# Patient Record
Sex: MALE | Race: WHITE | URBAN - METROPOLITAN AREA
[De-identification: names, ages, dates, MRNs, and addresses within clinical notes are randomized per-mention and may not be internally consistent; named-entity substitution may affect disease eponyms.]

---

## 2023-12-05 ENCOUNTER — OFFICE VISIT (OUTPATIENT)
Dept: ORTHOPEDIC SURGERY | Age: 68
End: 2023-12-05
Payer: MEDICARE

## 2023-12-05 VITALS — BODY MASS INDEX: 22.49 KG/M2 | HEIGHT: 65 IN | WEIGHT: 135 LBS

## 2023-12-05 DIAGNOSIS — M19.012 OSTEOARTHRITIS OF LEFT GLENOHUMERAL JOINT: ICD-10-CM

## 2023-12-05 DIAGNOSIS — M25.512 LEFT SHOULDER PAIN, UNSPECIFIED CHRONICITY: Primary | ICD-10-CM

## 2023-12-05 DIAGNOSIS — M18.12 ARTHRITIS OF CARPOMETACARPAL (CMC) JOINT OF LEFT THUMB: ICD-10-CM

## 2023-12-05 PROCEDURE — 20610 DRAIN/INJ JOINT/BURSA W/O US: CPT | Performed by: ORTHOPAEDIC SURGERY

## 2023-12-05 PROCEDURE — 1123F ACP DISCUSS/DSCN MKR DOCD: CPT | Performed by: ORTHOPAEDIC SURGERY

## 2023-12-05 PROCEDURE — 1036F TOBACCO NON-USER: CPT | Performed by: ORTHOPAEDIC SURGERY

## 2023-12-05 PROCEDURE — 3017F COLORECTAL CA SCREEN DOC REV: CPT | Performed by: ORTHOPAEDIC SURGERY

## 2023-12-05 PROCEDURE — 99204 OFFICE O/P NEW MOD 45 MIN: CPT | Performed by: ORTHOPAEDIC SURGERY

## 2023-12-05 PROCEDURE — G8427 DOCREV CUR MEDS BY ELIG CLIN: HCPCS | Performed by: ORTHOPAEDIC SURGERY

## 2023-12-05 PROCEDURE — G8420 CALC BMI NORM PARAMETERS: HCPCS | Performed by: ORTHOPAEDIC SURGERY

## 2023-12-05 PROCEDURE — G8484 FLU IMMUNIZE NO ADMIN: HCPCS | Performed by: ORTHOPAEDIC SURGERY

## 2023-12-05 RX ORDER — TRAZODONE HYDROCHLORIDE 100 MG/1
100 TABLET ORAL
COMMUNITY
Start: 2023-11-05

## 2023-12-05 RX ORDER — FLUOXETINE HYDROCHLORIDE 20 MG/1
20 CAPSULE ORAL
COMMUNITY
Start: 2023-10-16

## 2023-12-05 RX ORDER — LOSARTAN POTASSIUM 25 MG/1
25 TABLET ORAL DAILY
COMMUNITY
Start: 2023-11-14

## 2023-12-05 RX ORDER — BUPROPION HYDROCHLORIDE 150 MG/1
150 TABLET ORAL
COMMUNITY
Start: 2023-10-04

## 2023-12-05 RX ORDER — RIVAROXABAN 20 MG/1
20 TABLET, FILM COATED ORAL
COMMUNITY
Start: 2023-11-17

## 2023-12-05 RX ORDER — SPIRONOLACTONE 25 MG/1
25 TABLET ORAL
COMMUNITY
Start: 2023-09-24

## 2023-12-05 RX ORDER — HYDROCODONE BITARTRATE AND ACETAMINOPHEN 10; 325 MG/1; MG/1
TABLET ORAL
COMMUNITY
Start: 2023-12-01

## 2023-12-05 RX ORDER — DILTIAZEM HYDROCHLORIDE 120 MG/1
120 CAPSULE, EXTENDED RELEASE ORAL DAILY
COMMUNITY
Start: 2023-11-05

## 2023-12-15 ENCOUNTER — OFFICE VISIT (OUTPATIENT)
Dept: ORTHOPEDIC SURGERY | Age: 68
End: 2023-12-15

## 2023-12-15 DIAGNOSIS — M79.642 BILATERAL HAND PAIN: ICD-10-CM

## 2023-12-15 DIAGNOSIS — M79.641 BILATERAL HAND PAIN: ICD-10-CM

## 2023-12-15 DIAGNOSIS — M79.642 LEFT HAND PAIN: Primary | ICD-10-CM

## 2023-12-15 DIAGNOSIS — M18.12 ARTHRITIS OF CARPOMETACARPAL (CMC) JOINT OF LEFT THUMB: ICD-10-CM

## 2023-12-15 RX ORDER — BETAMETHASONE SODIUM PHOSPHATE AND BETAMETHASONE ACETATE 3; 3 MG/ML; MG/ML
6 INJECTION, SUSPENSION INTRA-ARTICULAR; INTRALESIONAL; INTRAMUSCULAR; SOFT TISSUE ONCE
Status: COMPLETED | OUTPATIENT
Start: 2023-12-15 | End: 2023-12-15

## 2023-12-15 RX ORDER — MELOXICAM 15 MG/1
15 TABLET ORAL DAILY
Qty: 30 TABLET | Refills: 1 | Status: CANCELLED | OUTPATIENT
Start: 2023-12-15 | End: 2024-02-13

## 2023-12-15 RX ADMIN — BETAMETHASONE SODIUM PHOSPHATE AND BETAMETHASONE ACETATE 6 MG: 3; 3 INJECTION, SUSPENSION INTRA-ARTICULAR; INTRALESIONAL; INTRAMUSCULAR; SOFT TISSUE at 08:36

## 2023-12-15 NOTE — PROGRESS NOTES
Orthopaedic Hand Surgery Note    Name: Nurys Gonzales  Age: 70 y.o. YOB: 1955  Gender: male  MRN: [id number]    CC: New patient referred for hand pain    HPI: Patient is a 70 y.o. male  right hand dominant with a chief complaint of left thumb pain and weakness. The patient complains of increased pain with activities involving pinch and  (ex/ opening jars, turning car key, buttons). Evaluation to date has included none. Treatment to date has included none. The current pain is rated a 7/10, alleviated by rest and interferes with daily activities. ROS/Meds/PSH/PMH/FH/SH: I personally reviewed the patients standard intake form. Pertinents are discussed in the HPI    Physical Examination:  General: Awake and alert. HEENT: Normocephalic, atraumatic  CV/Pulm: Breathing even and unlabored  Skin: No obvious rashes noted. Lymphatic: No obvious evidence of lymphedema or lymphadenopathy    Musculoskeletal:   Examination of the left upper extremity demonstrates normal sensation in median, ulnar and radial distribution, cap refill in all fingers < 5 seconds, negative carpal tunnel compression and Phalen test.  Mild prominence and instability of the base of first metacarpal. CMC grind is positive for pain and crepitus. Thumb MCP joint hyperextends 5 degrees. No pain at the radial styloid. Examination of right hand demonstrates mild swelling of the right ring finger PIP joint with tenderness to palpation    Imaging / Electrodiagnostic Tests:     bilateral Hand XR: AP, Lateral, Oblique and Thumb CMC joint     Clinical Indication:  1. Left hand pain    2. Bilateral hand pain    3.  Arthritis of carpometacarpal (CMC) joint of left thumb           Report: AP, lateral, oblique and thumb CMC joint x-ray demonstrates joint space narrowing, subluxation and osteophytic changes of the trapezium consistent with osteoarthritis of the thumb CMC joint, stage IV on the left, stage II in the right    Impression:

## 2024-01-16 ENCOUNTER — OFFICE VISIT (OUTPATIENT)
Dept: ORTHOPEDIC SURGERY | Age: 69
End: 2024-01-16
Payer: MEDICARE

## 2024-01-16 DIAGNOSIS — M25.512 LEFT SHOULDER PAIN, UNSPECIFIED CHRONICITY: Primary | ICD-10-CM

## 2024-01-16 DIAGNOSIS — M19.012 OSTEOARTHRITIS OF LEFT GLENOHUMERAL JOINT: ICD-10-CM

## 2024-01-16 PROCEDURE — G8427 DOCREV CUR MEDS BY ELIG CLIN: HCPCS | Performed by: ORTHOPAEDIC SURGERY

## 2024-01-16 PROCEDURE — G8484 FLU IMMUNIZE NO ADMIN: HCPCS | Performed by: ORTHOPAEDIC SURGERY

## 2024-01-16 PROCEDURE — 99214 OFFICE O/P EST MOD 30 MIN: CPT | Performed by: ORTHOPAEDIC SURGERY

## 2024-01-16 PROCEDURE — 1123F ACP DISCUSS/DSCN MKR DOCD: CPT | Performed by: ORTHOPAEDIC SURGERY

## 2024-01-16 PROCEDURE — 3017F COLORECTAL CA SCREEN DOC REV: CPT | Performed by: ORTHOPAEDIC SURGERY

## 2024-01-16 PROCEDURE — 1036F TOBACCO NON-USER: CPT | Performed by: ORTHOPAEDIC SURGERY

## 2024-01-16 PROCEDURE — G8420 CALC BMI NORM PARAMETERS: HCPCS | Performed by: ORTHOPAEDIC SURGERY

## 2024-01-16 NOTE — PROGRESS NOTES
joint       Rule out internal derangement left shoulder  Status post EUA and manipulation left shoulder arthroscopy left shoulder ASD, ADCR, debridement SLAP tear, lysis of adhesions 10.5 years  Status post arthroscopy right shoulder ASD ADCR ASR 16 years  Cervical spondylosis at multiple levels  Status post previous right knee arthroscopy  This post previous left knee arthroscopy  Status post revision open fasciotomy debridement extensor mechanism repair lateral epicondyle of both elbows  Status post primary open fasciotomy debridement and extensor mechanism repair lateral epicondyle of both elbows  Hypertension  Atrial fibrillation on Xarelto  Chronic pain on Norco 10    Plan:   I discussed the problem with the patient.  I discussed nonoperative versus operative intervention including injections.  The left shoulder injection helped but he still having pain in the left shoulder.  I discussed the potential need for a reverse left total shoulder arthroplasty if he does not improve.  Will defer surgery for now.  We will defer further injections for now.  I would like to obtain an MRI of the left shoulder.  If Dr. Calderón feels that his left thumb does require surgery he can proceed first.  I will recheck him back following the MRI of the left shoulder  4 This is an undiagnosed new problem with uncertain prognosis    Follow up: No follow-ups on file.             SAMUEL ALEXIS JR, MD

## 2024-01-29 ENCOUNTER — TELEPHONE (OUTPATIENT)
Dept: ORTHOPEDIC SURGERY | Age: 69
End: 2024-01-29

## 2024-01-29 NOTE — TELEPHONE ENCOUNTER
Patient states he received an email saying his mri was rescheduled from tomorrow however his my chart is still showing tomorrow. States if it has to be changed see if he can go some place else in order to keep return appt.

## 2024-01-29 NOTE — TELEPHONE ENCOUNTER
Called and spoke with the patient, everything is straightened out for his MRI tomorrow. Patient has a follow up appointment.

## 2024-01-30 ENCOUNTER — HOSPITAL ENCOUNTER (OUTPATIENT)
Dept: MRI IMAGING | Age: 69
Discharge: HOME | End: 2024-02-02
Attending: ORTHOPAEDIC SURGERY
Payer: MEDICARE

## 2024-01-30 DIAGNOSIS — M25.512 LEFT SHOULDER PAIN, UNSPECIFIED CHRONICITY: ICD-10-CM

## 2024-01-30 PROCEDURE — 73221 MRI JOINT UPR EXTREM W/O DYE: CPT

## 2024-02-06 ENCOUNTER — OFFICE VISIT (OUTPATIENT)
Dept: ORTHOPEDIC SURGERY | Age: 69
End: 2024-02-06

## 2024-02-06 DIAGNOSIS — M75.22 BICIPITAL TENDINITIS OF LEFT SHOULDER: ICD-10-CM

## 2024-02-06 DIAGNOSIS — M19.012 OSTEOARTHRITIS OF LEFT GLENOHUMERAL JOINT: Primary | ICD-10-CM

## 2024-02-14 ENCOUNTER — TELEPHONE (OUTPATIENT)
Dept: ORTHOPEDIC SURGERY | Age: 69
End: 2024-02-14

## 2024-02-27 DIAGNOSIS — M19.012 OSTEOARTHRITIS OF LEFT GLENOHUMERAL JOINT: ICD-10-CM

## 2024-02-27 DIAGNOSIS — M75.22 BICIPITAL TENDINITIS OF LEFT SHOULDER: ICD-10-CM

## 2024-03-07 ENCOUNTER — HOSPITAL ENCOUNTER (OUTPATIENT)
Dept: SURGERY | Age: 69
Discharge: HOME | End: 2024-03-07
Payer: MEDICARE

## 2024-03-07 VITALS
WEIGHT: 142.86 LBS | HEIGHT: 65 IN | HEART RATE: 76 BPM | DIASTOLIC BLOOD PRESSURE: 89 MMHG | RESPIRATION RATE: 16 BRPM | OXYGEN SATURATION: 97 % | BODY MASS INDEX: 23.8 KG/M2 | SYSTOLIC BLOOD PRESSURE: 147 MMHG | TEMPERATURE: 98.6 F

## 2024-03-07 LAB
ANION GAP SERPL CALC-SCNC: 5 MMOL/L (ref 2–11)
APPEARANCE UR: CLEAR
APTT PPP: 38.1 SEC (ref 23.3–37.4)
BACTERIA URNS QL MICRO: 0 /HPF
BILIRUB UR QL: NEGATIVE
BUN SERPL-MCNC: 15 MG/DL (ref 8–23)
CALCIUM SERPL-MCNC: 8.9 MG/DL (ref 8.3–10.4)
CHLORIDE SERPL-SCNC: 111 MMOL/L (ref 103–113)
CO2 SERPL-SCNC: 25 MMOL/L (ref 21–32)
COLOR UR: YELLOW
CREAT SERPL-MCNC: 1.26 MG/DL (ref 0.8–1.5)
ERYTHROCYTE [DISTWIDTH] IN BLOOD BY AUTOMATED COUNT: 11.6 % (ref 11.9–14.6)
GLUCOSE SERPL-MCNC: 103 MG/DL (ref 65–100)
GLUCOSE UR STRIP.AUTO-MCNC: NEGATIVE MG/DL
HCT VFR BLD AUTO: 43.9 % (ref 41.1–50.3)
HGB BLD-MCNC: 15.2 G/DL (ref 13.6–17.2)
HGB UR QL STRIP: ABNORMAL
INR PPP: 1.7
KETONES UR QL STRIP.AUTO: NEGATIVE MG/DL
LEUKOCYTE ESTERASE UR QL STRIP.AUTO: NEGATIVE
MAGNESIUM SERPL-MCNC: 2.2 MG/DL (ref 1.8–2.4)
MCH RBC QN AUTO: 31.1 PG (ref 26.1–32.9)
MCHC RBC AUTO-ENTMCNC: 34.6 G/DL (ref 31.4–35)
MCV RBC AUTO: 90 FL (ref 82–102)
MRSA DNA SPEC QL NAA+PROBE: NOT DETECTED
NITRITE UR QL STRIP.AUTO: NEGATIVE
NRBC # BLD: 0 K/UL (ref 0–0.2)
PH UR STRIP: 6 (ref 5–9)
PLATELET # BLD AUTO: 184 K/UL (ref 150–450)
PMV BLD AUTO: 9.8 FL (ref 9.4–12.3)
POTASSIUM SERPL-SCNC: 4.3 MMOL/L (ref 3.5–5.1)
PROT UR STRIP-MCNC: NEGATIVE MG/DL
PROTHROMBIN TIME: 19.8 SEC (ref 11.3–14.9)
RBC # BLD AUTO: 4.88 M/UL (ref 4.23–5.6)
S AUREUS CPE NOSE QL NAA+PROBE: NOT DETECTED
SODIUM SERPL-SCNC: 141 MMOL/L (ref 136–146)
SP GR UR REFRACTOMETRY: 1.01 (ref 1–1.02)
UROBILINOGEN UR QL STRIP.AUTO: 0.2 EU/DL (ref 0.2–1)
WBC # BLD AUTO: 4.7 K/UL (ref 4.3–11.1)

## 2024-03-07 PROCEDURE — 85730 THROMBOPLASTIN TIME PARTIAL: CPT

## 2024-03-07 PROCEDURE — 81003 URINALYSIS AUTO W/O SCOPE: CPT

## 2024-03-07 PROCEDURE — 85610 PROTHROMBIN TIME: CPT

## 2024-03-07 PROCEDURE — 87641 MR-STAPH DNA AMP PROBE: CPT

## 2024-03-07 PROCEDURE — 80048 BASIC METABOLIC PNL TOTAL CA: CPT

## 2024-03-07 PROCEDURE — 83735 ASSAY OF MAGNESIUM: CPT

## 2024-03-07 PROCEDURE — 85027 COMPLETE CBC AUTOMATED: CPT

## 2024-03-07 RX ORDER — DILTIAZEM HYDROCHLORIDE 120 MG/1
120 CAPSULE, EXTENDED RELEASE ORAL NIGHTLY
COMMUNITY

## 2024-03-07 ASSESSMENT — PAIN SCALES - GENERAL: PAINLEVEL_OUTOF10: 5

## 2024-03-07 NOTE — PERIOP NOTE
PLEASE CONTINUE TAKING ALL PRESCRIPTION MEDICATIONS UP TO THE DAY OF SURGERY UNLESS OTHERWISE DIRECTED BELOW.     DISCONTINUE all over the counter vitamins, supplements, and herbals starting now. DISCONTINUE Non-Steroidal Anti-Inflammatory (NSAIDS) such as Advil, Ibuprofen, Motrin, Naproxen, and Aleve 5 days prior to surgery.      *IT IS OK TO TAKE TYLENOL, Allergy medication, and indigestion medications*     Prescription medications to HOLD     Follow surgeon/cardiologist instructions regarding Xarelto           CONTINUE all other prescriptions like normal up until the day of surgery--TAKE ONLY THE BELOW MEDICATIONS THE DAY OF SURGERY.    Norco if needed, Wellbutrin, Prozac              Comments       Bring Dynahex soap and incentive spirometer back to the hospital.          Please do not bring home medications with you on the day of surgery unless otherwise directed by your nurse.  If you are instructed to bring home medications, please give them to your nurse as they will be administered by the nursing staff.     If you have any questions, please call [facility] [phone].     A copy of this note was provided to the patient for reference.

## 2024-03-07 NOTE — PERIOP NOTE
Patient verified name and .    Order for consent NOT found in EHR; patient verified.     Type 3 surgery, joint assessment complete.    Labs per surgeon: No orders received.   Labs per anesthesia protocol: cbc, bmp, mg, pt/ptt, ua; results pending.   EKG:Completed 24; results to be reviewed by anesthesia. Charge nurse to f/u.     Cardiology office note (24) with pre-op evaluation \"Patient denies symptoms consistent with unstable angina, decompensated heart failure, persistent uncontrolled atrial fibrillation, or ventricular arrhythmia. Able to achieve >4 METS of activity without easy fatigability, activity-limiting dyspnea, or chest pain. Appears euvolemic on exam today. ECG notes old inferior infarct pattern, chronic however and is noted on prior ECG from . RCRI class II risk.\"    Echo (23) in EHR if needed.     MRSA/MSSA swab collected; pharmacy to review and dose antibiotic as appropriate.     Hospital approved surgical skin cleanser and instructions to return bottle on DOS given per hospital policy.    Patient provided with handouts including Guide to Surgery, Pain Management, Hand Hygiene, Blood Transfusion Education, and [facility] Brochure.    Patient answered medical/surgical history questions at their best of ability. All prior to admission medications documented in [facility]. Original medication prescription bottle not visualized during patient appointment.     Patient instructed to hold all vitamins, supplements, herbals 3 weeks prior to surgery and NSAIDS 5 days prior to surgery.     Pt instructed to follow surgeon/cardiologist instructions regarding Xarelto.     Patient teach back successful and patient demonstrates knowledge of instruction.

## 2024-03-07 NOTE — PERIOP NOTE
PT/PTT results to be reviewed by anesthesia--charge nurse to f/u. All other lab results listed below are within anesthesia guidelines.      Latest Reference Range & Units 03/07/24 07:37   Sodium 136 - 146 mmol/L 141   Potassium 3.5 - 5.1 mmol/L 4.3   Chloride 103 - 113 mmol/L 111   CO2 21 - 32 mmol/L 25   BUN,BUNPL 8 - 23 MG/DL 15   Creatinine 0.8 - 1.5 MG/DL 1.26   Anion Gap 2 - 11 mmol/L 5   Est, Glom Filt Rate >60 ml/min/1.73m2 >60   Magnesium 1.8 - 2.4 mg/dL 2.2   Glucose, Random 65 - 100 mg/dL 103 (H)   CALCIUM, SERUM, 929855 8.3 - 10.4 MG/DL 8.9   WBC 4.3 - 11.1 K/uL 4.7   RBC 4.23 - 5.6 M/uL 4.88   Hemoglobin Quant 13.6 - 17.2 g/dL 15.2   Hematocrit 41.1 - 50.3 % 43.9   MCV 82.0 - 102.0 FL 90.0   MCH 26.1 - 32.9 PG 31.1   MCHC 31.4 - 35.0 g/dL 34.6   MPV 9.4 - 12.3 FL 9.8   RDW 11.9 - 14.6 % 11.6 (L)   Platelet Count 150 - 450 K/uL 184   Nucleated Red Blood Cells 0.0 - 0.2 K/uL 0.00   Prothrombin Time 11.3 - 14.9 sec 19.8 (H)   INR -   1.7   APTT 23.3 - 37.4 SEC 38.1 (H)   Color, UA -   YELLOW   Glucose, UA NEG mg/dL Negative   Bilirubin, Urine NEG   Negative   Ketones, Urine NEG mg/dL Negative   Specific Gravity, UA 1.001 - 1.023   1.010   Blood, Urine NEG   TRACE !   Protein, UA NEG mg/dL Negative   Urobilinogen, Urine 0.2 - 1.0 EU/dL 0.2   Nitrite, Urine NEG   Negative   Leukocyte Esterase, Urine NEG   Negative   Appearance -   CLEAR   pH, Urine 5.0 - 9.0   6.0   Bacteria, UA 0 /hpf 0   MRSA/STAPH AUREUS DNA  Rpt   (H): Data is abnormally high  (L): Data is abnormally low  !: Data is abnormal  Rpt: View report in Results Review for more information

## 2024-03-08 ENCOUNTER — PREP FOR PROCEDURE (OUTPATIENT)
Dept: ORTHOPEDIC SURGERY | Age: 69
End: 2024-03-08

## 2024-03-08 DIAGNOSIS — M19.012 OSTEOARTHRITIS OF LEFT GLENOHUMERAL JOINT: Primary | ICD-10-CM

## 2024-03-08 RX ORDER — SODIUM CHLORIDE 0.9 % (FLUSH) 0.9 %
5-40 SYRINGE (ML) INJECTION EVERY 12 HOURS SCHEDULED
Status: CANCELLED | OUTPATIENT
Start: 2024-03-08

## 2024-03-08 RX ORDER — SODIUM CHLORIDE 9 MG/ML
INJECTION, SOLUTION INTRAVENOUS PRN
Status: CANCELLED | OUTPATIENT
Start: 2024-03-08

## 2024-03-08 RX ORDER — SODIUM CHLORIDE 0.9 % (FLUSH) 0.9 %
5-40 SYRINGE (ML) INJECTION PRN
Status: CANCELLED | OUTPATIENT
Start: 2024-03-08

## 2024-03-10 NOTE — H&P
Subjective:     Patient is a 68 y.o. RHD MALE WITH LEFT SHOULDER PAIN.    SEE OFFICE NOTE.    Patient Active Problem List    Diagnosis Date Noted    Osteoarthritis of left glenohumeral joint 02/27/2024    Bicipital tendinitis of left shoulder 02/27/2024    Chondromalacia of patella 07/16/2013    Tear of lateral cartilage or meniscus of knee, current 07/16/2013    Chondromalacia 07/16/2013    Superior glenoid labrum lesion 04/18/2013    Adhesive capsulitis of shoulder 04/18/2013     Past Medical History:   Diagnosis Date    Anxiety and depression     managed with medication    Atrial fibrillation (HCC)     on Xarelto, followed by Cardiology    Essential hypertension     managed with medication    Heart failure with improved ejection fraction (HFimpEF) ([facility])     Followed by Cardiology    History of skin cancer     melanoma and squamous cell    Insomnia     Mixed hyperlipidemia       Past Surgical History:   Procedure Laterality Date    KNEE ARTHROSCOPY Bilateral     ORTHOPEDIC SURGERY Bilateral     elbow    SHOULDER ARTHROSCOPY Bilateral       Prior to Admission medications    Medication Sig Start Date End Date Taking? Authorizing Provider   dilTIAZem (CARDIZEM 12 HR) 120 MG extended release capsule Take 1 capsule by mouth nightly    Ray Roy MD   buPROPion (WELLBUTRIN XL) 150 MG extended release tablet Take 1 tablet by mouth 10/4/23   Ray Roy MD   FLUoxetine (PROZAC) 20 MG capsule Take 1 capsule by mouth 10/16/23   Ray Roy MD   HYDROcodone-acetaminophen (NORCO)  MG per tablet Take 1 tablet by mouth every 6 hours as needed for Pain. 12/1/23   Ray Roy MD   losartan (COZAAR) 25 MG tablet Take 1 tablet by mouth at bedtime 11/14/23   Ray Roy MD   XARELTO 20 MG TABS tablet Take 1 tablet by mouth Daily with supper 11/17/23   Ray Roy MD   spironolactone (ALDACTONE) 25 MG tablet Take 1 tablet by mouth daily 1/2 tablet in the morning

## 2024-03-11 NOTE — PROGRESS NOTES
mouth every 6 hours as needed for Pain. 12/1/23   Ray Roy MD   losartan (COZAAR) 25 MG tablet Take 1 tablet by mouth at bedtime 11/14/23   Ray Roy MD   XARELTO 20 MG TABS tablet Take 1 tablet by mouth Daily with supper 11/17/23   Ray Roy MD   spironolactone (ALDACTONE) 25 MG tablet Take 1 tablet by mouth daily 1/2 tablet in the morning 9/24/23   Ray Roy MD   traZODone (DESYREL) 100 MG tablet Take 1 tablet by mouth nightly 11/5/23   Ray Roy MD     Allergies   Allergen Reactions    Carvedilol Other (See Comments)     Skin peeling when Coreg dose was increased    Meloxicam Shortness Of Breath    Pregabalin Hallucinations and Shortness Of Breath     Feel depressed    Aspirin Other (See Comments)     GI Upset    Cimetidine      Other reaction(s): kidneys    Metoclopramide      Other reaction(s): insomina          Objective:     Physical Exam:   No data found.    ECG:    No results found for this or any previous visit (from the past 4464 hour(s)).    Data Review:   Labs:   Labs reviewed    Problem List:  )  Patient Active Problem List   Diagnosis    Chondromalacia of patella    Tear of lateral cartilage or meniscus of knee, current    Superior glenoid labrum lesion    Chondromalacia    Adhesive capsulitis of shoulder    Osteoarthritis of left glenohumeral joint    Bicipital tendinitis of left shoulder       Total Joint Surgery Pre-Assessment Recommendations:           Continuous saturation monitoring UPON ARRIVAL TO FLOOR.  Heated high flow lung expansion x 24 hours and prn.  IP RT consult for respiratory evaluation every shift      Signed By: Janessa Michelle, APRN - CNP-C    March 11, 2024

## 2024-03-13 ENCOUNTER — ANESTHESIA EVENT (OUTPATIENT)
Dept: SURGERY | Age: 69
End: 2024-03-13
Payer: MEDICARE

## 2024-03-14 ENCOUNTER — APPOINTMENT (OUTPATIENT)
Dept: GENERAL RADIOLOGY | Age: 69
End: 2024-03-14
Attending: ORTHOPAEDIC SURGERY
Payer: MEDICARE

## 2024-03-14 ENCOUNTER — ANESTHESIA (OUTPATIENT)
Dept: SURGERY | Age: 69
End: 2024-03-14
Payer: MEDICARE

## 2024-03-14 ENCOUNTER — HOSPITAL ENCOUNTER (OUTPATIENT)
Age: 69
Setting detail: OBSERVATION
Discharge: HOME | End: 2024-03-16
Attending: ORTHOPAEDIC SURGERY | Admitting: ORTHOPAEDIC SURGERY
Payer: MEDICARE

## 2024-03-14 DIAGNOSIS — M19.012 OSTEOARTHRITIS OF LEFT GLENOHUMERAL JOINT: ICD-10-CM

## 2024-03-14 DIAGNOSIS — M19.012 OSTEOARTHRITIS OF GLENOHUMERAL JOINT, LEFT: Primary | ICD-10-CM

## 2024-03-14 LAB
ABO + RH BLD: NORMAL
BLOOD GROUP ANTIBODIES SERPL: NORMAL
EST. AVERAGE GLUCOSE BLD GHB EST-MCNC: 97 MG/DL
GLUCOSE BLD STRIP.AUTO-MCNC: 105 MG/DL (ref 65–100)
HBA1C MFR BLD: 5 % (ref 4.8–5.6)
SERVICE CMNT-IMP: ABNORMAL
SPECIMEN EXP DATE BLD: NORMAL

## 2024-03-14 PROCEDURE — 2580000003 HC RX 258: Performed by: ANESTHESIOLOGY

## 2024-03-14 PROCEDURE — 86900 BLOOD TYPING SEROLOGIC ABO: CPT

## 2024-03-14 PROCEDURE — C1776 JOINT DEVICE (IMPLANTABLE): HCPCS | Performed by: ORTHOPAEDIC SURGERY

## 2024-03-14 PROCEDURE — 7100000000 HC PACU RECOVERY - FIRST 15 MIN: Performed by: ORTHOPAEDIC SURGERY

## 2024-03-14 PROCEDURE — 2580000003 HC RX 258: Performed by: NURSE ANESTHETIST, CERTIFIED REGISTERED

## 2024-03-14 PROCEDURE — 3600000015 HC SURGERY LEVEL 5 ADDTL 15MIN: Performed by: ORTHOPAEDIC SURGERY

## 2024-03-14 PROCEDURE — 94761 N-INVAS EAR/PLS OXIMETRY MLT: CPT

## 2024-03-14 PROCEDURE — 2700000000 HC OXYGEN THERAPY PER DAY

## 2024-03-14 PROCEDURE — 3600000005 HC SURGERY LEVEL 5 BASE: Performed by: ORTHOPAEDIC SURGERY

## 2024-03-14 PROCEDURE — 83036 HEMOGLOBIN GLYCOSYLATED A1C: CPT

## 2024-03-14 PROCEDURE — 82962 GLUCOSE BLOOD TEST: CPT

## 2024-03-14 PROCEDURE — C1713 ANCHOR/SCREW BN/BN,TIS/BN: HCPCS | Performed by: ORTHOPAEDIC SURGERY

## 2024-03-14 PROCEDURE — 2720000010 HC SURG SUPPLY STERILE: Performed by: ORTHOPAEDIC SURGERY

## 2024-03-14 PROCEDURE — 6360000002 HC RX W HCPCS: Performed by: ANESTHESIOLOGY

## 2024-03-14 PROCEDURE — 2500000003 HC RX 250 WO HCPCS: Performed by: NURSE ANESTHETIST, CERTIFIED REGISTERED

## 2024-03-14 PROCEDURE — G0378 HOSPITAL OBSERVATION PER HR: HCPCS

## 2024-03-14 PROCEDURE — 6370000000 HC RX 637 (ALT 250 FOR IP): Performed by: ANESTHESIOLOGY

## 2024-03-14 PROCEDURE — 6360000002 HC RX W HCPCS: Performed by: NURSE ANESTHETIST, CERTIFIED REGISTERED

## 2024-03-14 PROCEDURE — 86850 RBC ANTIBODY SCREEN: CPT

## 2024-03-14 PROCEDURE — 2709999900 HC NON-CHARGEABLE SUPPLY: Performed by: ORTHOPAEDIC SURGERY

## 2024-03-14 PROCEDURE — 3700000000 HC ANESTHESIA ATTENDED CARE: Performed by: ORTHOPAEDIC SURGERY

## 2024-03-14 PROCEDURE — 6370000000 HC RX 637 (ALT 250 FOR IP): Performed by: ORTHOPAEDIC SURGERY

## 2024-03-14 PROCEDURE — 7100000001 HC PACU RECOVERY - ADDTL 15 MIN: Performed by: ORTHOPAEDIC SURGERY

## 2024-03-14 PROCEDURE — 86901 BLOOD TYPING SEROLOGIC RH(D): CPT

## 2024-03-14 PROCEDURE — 23472 RECONSTRUCT SHOULDER JOINT: CPT | Performed by: ORTHOPAEDIC SURGERY

## 2024-03-14 PROCEDURE — 2580000003 HC RX 258: Performed by: ORTHOPAEDIC SURGERY

## 2024-03-14 PROCEDURE — 6360000002 HC RX W HCPCS: Performed by: ORTHOPAEDIC SURGERY

## 2024-03-14 PROCEDURE — 3700000001 HC ADD 15 MINUTES (ANESTHESIA): Performed by: ORTHOPAEDIC SURGERY

## 2024-03-14 PROCEDURE — 73030 X-RAY EXAM OF SHOULDER: CPT

## 2024-03-14 PROCEDURE — 94760 N-INVAS EAR/PLS OXIMETRY 1: CPT

## 2024-03-14 DEVICE — SCREW BNE L24MM DIA4.5MM PROX CORT TIB S STL ST LOK FULL: Type: IMPLANTABLE DEVICE | Site: SHOULDER | Status: FUNCTIONAL

## 2024-03-14 DEVICE — SCREW BNE L50MM DIA4.5MM PROX CORT TIB S STL ST LOK FULL: Type: IMPLANTABLE DEVICE | Site: SHOULDER | Status: FUNCTIONAL

## 2024-03-14 DEVICE — DELTA XTEND MONOBLOC HUM CEMENTED EPIPHYSIS SZ2 /DIA12 STD
Type: IMPLANTABLE DEVICE | Site: SHOULDER | Status: FUNCTIONAL
Brand: DELTA XTEND

## 2024-03-14 DEVICE — CEMENT BNE 20ML 41GM FULL DOSE PMMA W/ TOBRA M VISC RADPQ: Type: IMPLANTABLE DEVICE | Site: SHOULDER | Status: FUNCTIONAL

## 2024-03-14 DEVICE — SPACER HUM +9MM OFFSET SHLDR POLYETH FOR DELT XTEND REV SYS: Type: IMPLANTABLE DEVICE | Site: SHOULDER | Status: FUNCTIONAL

## 2024-03-14 DEVICE — PLUG, CEMENT SZ. 12.0
Type: IMPLANTABLE DEVICE | Site: SHOULDER | Status: FUNCTIONAL
Brand: DJO SURGICAL

## 2024-03-14 DEVICE — COMPONENT GLEN FIX DIA10MM SHLDR METAGLENE LNG PEG GLOB: Type: IMPLANTABLE DEVICE | Site: SHOULDER | Status: FUNCTIONAL

## 2024-03-14 DEVICE — DELTA XTEND LATERALIZED GLENOSPHERE +4MM ECCENTRIC 038MM
Type: IMPLANTABLE DEVICE | Site: SHOULDER | Status: FUNCTIONAL
Brand: DELTA XTEND

## 2024-03-14 DEVICE — CUP HUM DIA38MM +9MM OFFSET STD SHLDR POLYETH DELT XTEND: Type: IMPLANTABLE DEVICE | Site: SHOULDER | Status: FUNCTIONAL

## 2024-03-14 DEVICE — SHOULDER S3 TOT ADV REVERSE IMPL CAPPED S3: Type: IMPLANTABLE DEVICE | Status: FUNCTIONAL

## 2024-03-14 RX ORDER — HYDROMORPHONE HYDROCHLORIDE 4 MG/1
4 TABLET ORAL
Status: DISCONTINUED | OUTPATIENT
Start: 2024-03-14 | End: 2024-03-15

## 2024-03-14 RX ORDER — SODIUM CHLORIDE 0.9 % (FLUSH) 0.9 %
5-40 SYRINGE (ML) INJECTION PRN
Status: DISCONTINUED | OUTPATIENT
Start: 2024-03-14 | End: 2024-03-14 | Stop reason: HOSPADM

## 2024-03-14 RX ORDER — PROMETHAZINE HYDROCHLORIDE 25 MG/1
25 TABLET ORAL EVERY 4 HOURS PRN
Status: DISCONTINUED | OUTPATIENT
Start: 2024-03-14 | End: 2024-03-16 | Stop reason: HOSPADM

## 2024-03-14 RX ORDER — SODIUM CHLORIDE 0.9 % (FLUSH) 0.9 %
5-40 SYRINGE (ML) INJECTION PRN
Status: DISCONTINUED | OUTPATIENT
Start: 2024-03-14 | End: 2024-03-16 | Stop reason: HOSPADM

## 2024-03-14 RX ORDER — HYDROMORPHONE HYDROCHLORIDE 1 MG/ML
1 INJECTION, SOLUTION INTRAMUSCULAR; INTRAVENOUS; SUBCUTANEOUS
Status: DISCONTINUED | OUTPATIENT
Start: 2024-03-14 | End: 2024-03-15 | Stop reason: SDUPTHER

## 2024-03-14 RX ORDER — OXYCODONE HYDROCHLORIDE 5 MG/1
5 TABLET ORAL PRN
Status: DISCONTINUED | OUTPATIENT
Start: 2024-03-14 | End: 2024-03-14 | Stop reason: HOSPADM

## 2024-03-14 RX ORDER — GLYCOPYRROLATE 0.2 MG/ML
INJECTION INTRAMUSCULAR; INTRAVENOUS PRN
Status: DISCONTINUED | OUTPATIENT
Start: 2024-03-14 | End: 2024-03-14 | Stop reason: SDUPTHER

## 2024-03-14 RX ORDER — EPHEDRINE SULFATE 5 MG/ML
INJECTION INTRAVENOUS PRN
Status: DISCONTINUED | OUTPATIENT
Start: 2024-03-14 | End: 2024-03-14 | Stop reason: SDUPTHER

## 2024-03-14 RX ORDER — BISACODYL 10 MG
10 SUPPOSITORY, RECTAL RECTAL DAILY PRN
Status: DISCONTINUED | OUTPATIENT
Start: 2024-03-14 | End: 2024-03-16 | Stop reason: HOSPADM

## 2024-03-14 RX ORDER — SODIUM CHLORIDE 0.9 % (FLUSH) 0.9 %
5-40 SYRINGE (ML) INJECTION EVERY 12 HOURS SCHEDULED
Status: DISCONTINUED | OUTPATIENT
Start: 2024-03-14 | End: 2024-03-14 | Stop reason: HOSPADM

## 2024-03-14 RX ORDER — LIDOCAINE HYDROCHLORIDE 10 MG/ML
1 INJECTION, SOLUTION INFILTRATION; PERINEURAL
Status: DISCONTINUED | OUTPATIENT
Start: 2024-03-14 | End: 2024-03-14 | Stop reason: HOSPADM

## 2024-03-14 RX ORDER — ACETAMINOPHEN 500 MG
1000 TABLET ORAL ONCE
Status: COMPLETED | OUTPATIENT
Start: 2024-03-14 | End: 2024-03-14

## 2024-03-14 RX ORDER — HYDROMORPHONE HYDROCHLORIDE 1 MG/ML
0.5 INJECTION, SOLUTION INTRAMUSCULAR; INTRAVENOUS; SUBCUTANEOUS EVERY 5 MIN PRN
Status: DISCONTINUED | OUTPATIENT
Start: 2024-03-14 | End: 2024-03-14 | Stop reason: HOSPADM

## 2024-03-14 RX ORDER — SODIUM CHLORIDE 0.9 % (FLUSH) 0.9 %
5-40 SYRINGE (ML) INJECTION EVERY 12 HOURS SCHEDULED
Status: DISCONTINUED | OUTPATIENT
Start: 2024-03-14 | End: 2024-03-16 | Stop reason: HOSPADM

## 2024-03-14 RX ORDER — OXYCODONE HYDROCHLORIDE 5 MG/1
10 TABLET ORAL PRN
Status: DISCONTINUED | OUTPATIENT
Start: 2024-03-14 | End: 2024-03-14 | Stop reason: HOSPADM

## 2024-03-14 RX ORDER — HYDROMORPHONE HYDROCHLORIDE 2 MG/1
2 TABLET ORAL
Status: DISCONTINUED | OUTPATIENT
Start: 2024-03-14 | End: 2024-03-15

## 2024-03-14 RX ORDER — TRAZODONE HYDROCHLORIDE 50 MG/1
100 TABLET ORAL NIGHTLY
Status: DISCONTINUED | OUTPATIENT
Start: 2024-03-14 | End: 2024-03-16 | Stop reason: HOSPADM

## 2024-03-14 RX ORDER — ONDANSETRON 2 MG/ML
4 INJECTION INTRAMUSCULAR; INTRAVENOUS
Status: DISCONTINUED | OUTPATIENT
Start: 2024-03-14 | End: 2024-03-14 | Stop reason: HOSPADM

## 2024-03-14 RX ORDER — PROCHLORPERAZINE EDISYLATE 5 MG/ML
5 INJECTION INTRAMUSCULAR; INTRAVENOUS
Status: DISCONTINUED | OUTPATIENT
Start: 2024-03-14 | End: 2024-03-14 | Stop reason: HOSPADM

## 2024-03-14 RX ORDER — SODIUM CHLORIDE 9 MG/ML
INJECTION, SOLUTION INTRAVENOUS PRN
Status: DISCONTINUED | OUTPATIENT
Start: 2024-03-14 | End: 2024-03-16 | Stop reason: HOSPADM

## 2024-03-14 RX ORDER — ALBUTEROL SULFATE 2.5 MG/3ML
2.5 SOLUTION RESPIRATORY (INHALATION) EVERY 6 HOURS PRN
Status: DISCONTINUED | OUTPATIENT
Start: 2024-03-14 | End: 2024-03-16 | Stop reason: HOSPADM

## 2024-03-14 RX ORDER — ROCURONIUM BROMIDE 10 MG/ML
INJECTION, SOLUTION INTRAVENOUS PRN
Status: DISCONTINUED | OUTPATIENT
Start: 2024-03-14 | End: 2024-03-14 | Stop reason: SDUPTHER

## 2024-03-14 RX ORDER — ONDANSETRON 4 MG/1
4 TABLET, ORALLY DISINTEGRATING ORAL EVERY 8 HOURS PRN
Status: DISCONTINUED | OUTPATIENT
Start: 2024-03-14 | End: 2024-03-16 | Stop reason: HOSPADM

## 2024-03-14 RX ORDER — ONDANSETRON 2 MG/ML
INJECTION INTRAMUSCULAR; INTRAVENOUS PRN
Status: DISCONTINUED | OUTPATIENT
Start: 2024-03-14 | End: 2024-03-14 | Stop reason: SDUPTHER

## 2024-03-14 RX ORDER — NEOSTIGMINE METHYLSULFATE 1 MG/ML
INJECTION, SOLUTION INTRAVENOUS PRN
Status: DISCONTINUED | OUTPATIENT
Start: 2024-03-14 | End: 2024-03-14 | Stop reason: SDUPTHER

## 2024-03-14 RX ORDER — MIDAZOLAM HYDROCHLORIDE 2 MG/2ML
2 INJECTION, SOLUTION INTRAMUSCULAR; INTRAVENOUS
Status: COMPLETED | OUTPATIENT
Start: 2024-03-14 | End: 2024-03-14

## 2024-03-14 RX ORDER — PROPOFOL 10 MG/ML
INJECTION, EMULSION INTRAVENOUS PRN
Status: DISCONTINUED | OUTPATIENT
Start: 2024-03-14 | End: 2024-03-14 | Stop reason: SDUPTHER

## 2024-03-14 RX ORDER — SODIUM CHLORIDE, SODIUM LACTATE, POTASSIUM CHLORIDE, CALCIUM CHLORIDE 600; 310; 30; 20 MG/100ML; MG/100ML; MG/100ML; MG/100ML
INJECTION, SOLUTION INTRAVENOUS CONTINUOUS
Status: DISCONTINUED | OUTPATIENT
Start: 2024-03-14 | End: 2024-03-14 | Stop reason: HOSPADM

## 2024-03-14 RX ORDER — SODIUM CHLORIDE 9 MG/ML
INJECTION, SOLUTION INTRAVENOUS PRN
Status: DISCONTINUED | OUTPATIENT
Start: 2024-03-14 | End: 2024-03-14

## 2024-03-14 RX ORDER — ENEMA 19; 7 G/133ML; G/133ML
1 ENEMA RECTAL
Status: DISPENSED | OUTPATIENT
Start: 2024-03-14 | End: 2024-03-15

## 2024-03-14 RX ORDER — NALOXONE HYDROCHLORIDE 0.4 MG/ML
INJECTION, SOLUTION INTRAMUSCULAR; INTRAVENOUS; SUBCUTANEOUS PRN
Status: DISCONTINUED | OUTPATIENT
Start: 2024-03-14 | End: 2024-03-14 | Stop reason: HOSPADM

## 2024-03-14 RX ORDER — FLUOXETINE HYDROCHLORIDE 20 MG/1
20 CAPSULE ORAL DAILY
Status: DISCONTINUED | OUTPATIENT
Start: 2024-03-14 | End: 2024-03-16 | Stop reason: HOSPADM

## 2024-03-14 RX ORDER — SPIRONOLACTONE 25 MG/1
25 TABLET ORAL DAILY
Status: DISCONTINUED | OUTPATIENT
Start: 2024-03-14 | End: 2024-03-16 | Stop reason: HOSPADM

## 2024-03-14 RX ORDER — FENTANYL CITRATE 50 UG/ML
100 INJECTION, SOLUTION INTRAMUSCULAR; INTRAVENOUS
Status: COMPLETED | OUTPATIENT
Start: 2024-03-14 | End: 2024-03-14

## 2024-03-14 RX ORDER — DIPHENHYDRAMINE HYDROCHLORIDE 50 MG/ML
12.5 INJECTION INTRAMUSCULAR; INTRAVENOUS
Status: DISCONTINUED | OUTPATIENT
Start: 2024-03-14 | End: 2024-03-14 | Stop reason: HOSPADM

## 2024-03-14 RX ORDER — LOSARTAN POTASSIUM 25 MG/1
25 TABLET ORAL NIGHTLY
Status: DISCONTINUED | OUTPATIENT
Start: 2024-03-14 | End: 2024-03-16 | Stop reason: HOSPADM

## 2024-03-14 RX ORDER — VASOPRESSIN 20 U/ML
INJECTION PARENTERAL PRN
Status: DISCONTINUED | OUTPATIENT
Start: 2024-03-14 | End: 2024-03-14 | Stop reason: SDUPTHER

## 2024-03-14 RX ORDER — SODIUM CHLORIDE 9 MG/ML
INJECTION, SOLUTION INTRAVENOUS PRN
Status: DISCONTINUED | OUTPATIENT
Start: 2024-03-14 | End: 2024-03-14 | Stop reason: HOSPADM

## 2024-03-14 RX ORDER — DOCUSATE SODIUM 100 MG/1
100 CAPSULE, LIQUID FILLED ORAL 2 TIMES DAILY
Status: DISCONTINUED | OUTPATIENT
Start: 2024-03-15 | End: 2024-03-16 | Stop reason: HOSPADM

## 2024-03-14 RX ORDER — LIDOCAINE HYDROCHLORIDE 20 MG/ML
INJECTION, SOLUTION EPIDURAL; INFILTRATION; INTRACAUDAL; PERINEURAL PRN
Status: DISCONTINUED | OUTPATIENT
Start: 2024-03-14 | End: 2024-03-14 | Stop reason: SDUPTHER

## 2024-03-14 RX ORDER — BUPROPION HYDROCHLORIDE 150 MG/1
150 TABLET ORAL DAILY
Status: DISCONTINUED | OUTPATIENT
Start: 2024-03-14 | End: 2024-03-16 | Stop reason: HOSPADM

## 2024-03-14 RX ORDER — HYDROMORPHONE HYDROCHLORIDE 1 MG/ML
0.25 INJECTION, SOLUTION INTRAMUSCULAR; INTRAVENOUS; SUBCUTANEOUS EVERY 5 MIN PRN
Status: DISCONTINUED | OUTPATIENT
Start: 2024-03-14 | End: 2024-03-14 | Stop reason: HOSPADM

## 2024-03-14 RX ORDER — FERROUS SULFATE 325(65) MG
325 TABLET ORAL 2 TIMES DAILY WITH MEALS
Status: DISCONTINUED | OUTPATIENT
Start: 2024-03-15 | End: 2024-03-16 | Stop reason: HOSPADM

## 2024-03-14 RX ADMIN — PHENYLEPHRINE HYDROCHLORIDE 100 MCG: 0.1 INJECTION, SOLUTION INTRAVENOUS at 11:30

## 2024-03-14 RX ADMIN — PROPOFOL 50 MG: 10 INJECTION, EMULSION INTRAVENOUS at 12:04

## 2024-03-14 RX ADMIN — EPHEDRINE SULFATE 5 MG: 5 INJECTION INTRAVENOUS at 11:30

## 2024-03-14 RX ADMIN — PHENYLEPHRINE HYDROCHLORIDE 25 MCG/MIN: 10 INJECTION INTRAVENOUS at 12:45

## 2024-03-14 RX ADMIN — GLYCOPYRROLATE 0.4 MG: 0.2 INJECTION INTRAMUSCULAR; INTRAVENOUS at 12:53

## 2024-03-14 RX ADMIN — MIDAZOLAM 2 MG: 1 INJECTION INTRAMUSCULAR; INTRAVENOUS at 10:03

## 2024-03-14 RX ADMIN — ROCURONIUM BROMIDE 50 MG: 10 INJECTION, SOLUTION INTRAVENOUS at 11:29

## 2024-03-14 RX ADMIN — PHENYLEPHRINE HYDROCHLORIDE 50 MCG: 0.1 INJECTION, SOLUTION INTRAVENOUS at 11:51

## 2024-03-14 RX ADMIN — PHENYLEPHRINE HYDROCHLORIDE 100 MCG: 0.1 INJECTION, SOLUTION INTRAVENOUS at 11:43

## 2024-03-14 RX ADMIN — Medication 3 MG: at 12:53

## 2024-03-14 RX ADMIN — FENTANYL CITRATE 100 MCG: 50 INJECTION INTRAMUSCULAR; INTRAVENOUS at 10:03

## 2024-03-14 RX ADMIN — EPHEDRINE SULFATE 5 MG: 5 INJECTION INTRAVENOUS at 12:56

## 2024-03-14 RX ADMIN — PHENYLEPHRINE HYDROCHLORIDE 100 MCG: 0.1 INJECTION, SOLUTION INTRAVENOUS at 12:12

## 2024-03-14 RX ADMIN — SODIUM CHLORIDE, SODIUM LACTATE, POTASSIUM CHLORIDE, AND CALCIUM CHLORIDE: 600; 310; 30; 20 INJECTION, SOLUTION INTRAVENOUS at 12:19

## 2024-03-14 RX ADMIN — DILTIAZEM HYDROCHLORIDE 120 MG: 30 TABLET, FILM COATED ORAL at 20:15

## 2024-03-14 RX ADMIN — EPHEDRINE SULFATE 5 MG: 5 INJECTION INTRAVENOUS at 11:52

## 2024-03-14 RX ADMIN — HYDROMORPHONE HYDROCHLORIDE 4 MG: 4 TABLET ORAL at 22:10

## 2024-03-14 RX ADMIN — ACETAMINOPHEN 1000 MG: 500 TABLET, FILM COATED ORAL at 08:34

## 2024-03-14 RX ADMIN — ONDANSETRON 4 MG: 2 INJECTION INTRAMUSCULAR; INTRAVENOUS at 11:54

## 2024-03-14 RX ADMIN — TRAZODONE HYDROCHLORIDE 100 MG: 50 TABLET ORAL at 20:15

## 2024-03-14 RX ADMIN — PROPOFOL 200 MG: 10 INJECTION, EMULSION INTRAVENOUS at 11:29

## 2024-03-14 RX ADMIN — VASOPRESSIN 2 UNITS: 20 INJECTION PARENTERAL at 12:17

## 2024-03-14 RX ADMIN — HYDROMORPHONE HYDROCHLORIDE 4 MG: 4 TABLET ORAL at 16:46

## 2024-03-14 RX ADMIN — VASOPRESSIN 2 UNITS: 20 INJECTION PARENTERAL at 12:45

## 2024-03-14 RX ADMIN — SODIUM CHLORIDE, SODIUM LACTATE, POTASSIUM CHLORIDE, AND CALCIUM CHLORIDE: 600; 310; 30; 20 INJECTION, SOLUTION INTRAVENOUS at 11:13

## 2024-03-14 RX ADMIN — HYDROMORPHONE HYDROCHLORIDE 1 MG: 1 INJECTION, SOLUTION INTRAMUSCULAR; INTRAVENOUS; SUBCUTANEOUS at 23:13

## 2024-03-14 RX ADMIN — LOSARTAN POTASSIUM 25 MG: 25 TABLET, FILM COATED ORAL at 20:15

## 2024-03-14 RX ADMIN — CEFAZOLIN 1000 MG: 1 INJECTION, POWDER, FOR SOLUTION INTRAMUSCULAR; INTRAVENOUS at 20:15

## 2024-03-14 RX ADMIN — Medication 2000 MG: at 11:38

## 2024-03-14 RX ADMIN — EPHEDRINE SULFATE 10 MG: 5 INJECTION INTRAVENOUS at 12:12

## 2024-03-14 RX ADMIN — LIDOCAINE HYDROCHLORIDE 100 MG: 20 INJECTION, SOLUTION EPIDURAL; INFILTRATION; INTRACAUDAL; PERINEURAL at 11:29

## 2024-03-14 RX ADMIN — SODIUM CHLORIDE, SODIUM LACTATE, POTASSIUM CHLORIDE, AND CALCIUM CHLORIDE: 600; 310; 30; 20 INJECTION, SOLUTION INTRAVENOUS at 08:27

## 2024-03-14 RX ADMIN — SODIUM CHLORIDE, PRESERVATIVE FREE 10 ML: 5 INJECTION INTRAVENOUS at 20:15

## 2024-03-14 RX ADMIN — VASOPRESSIN 2 UNITS: 20 INJECTION PARENTERAL at 12:34

## 2024-03-14 RX ADMIN — HYDROMORPHONE HYDROCHLORIDE 4 MG: 4 TABLET ORAL at 19:31

## 2024-03-14 ASSESSMENT — PAIN SCALES - GENERAL
PAINLEVEL_OUTOF10: 6
PAINLEVEL_OUTOF10: 6
PAINLEVEL_OUTOF10: 1
PAINLEVEL_OUTOF10: 9
PAINLEVEL_OUTOF10: 6
PAINLEVEL_OUTOF10: 1
PAINLEVEL_OUTOF10: 1
PAINLEVEL_OUTOF10: 2

## 2024-03-14 ASSESSMENT — PAIN DESCRIPTION - ORIENTATION
ORIENTATION: LEFT

## 2024-03-14 ASSESSMENT — PAIN DESCRIPTION - DESCRIPTORS
DESCRIPTORS: ACHING
DESCRIPTORS: TINGLING
DESCRIPTORS: ACHING

## 2024-03-14 ASSESSMENT — PAIN DESCRIPTION - LOCATION
LOCATION: SHOULDER

## 2024-03-14 ASSESSMENT — PAIN - FUNCTIONAL ASSESSMENT
PAIN_FUNCTIONAL_ASSESSMENT: 0-10
PAIN_FUNCTIONAL_ASSESSMENT: PREVENTS OR INTERFERES SOME ACTIVE ACTIVITIES AND ADLS

## 2024-03-14 NOTE — PROGRESS NOTES
03/14/24 1749   Treatment   Treatment Type IS   Oxygen Therapy/Pulse Ox   O2 Therapy Room air   Pulse 85   Respirations 17   SpO2 97 %   Pulse Oximeter Device Mode Intermittent   $Pulse Oximeter $Spot check (single)   Incentive Spirometry Tx   Treatment Effort Assisted by RT

## 2024-03-14 NOTE — CARE COORDINATION
MD order rec'd to arrange home health. Pt initially asking for [facility] but I called their Intake and was told they do not have home PT in the [facility]. Pt then chose [facility]. Referral sent to [facility]. Will follow until d/c.        03/14/24 1320   Service Assessment   Patient Orientation Alert and Oriented   Cognition Alert   History Provided By Patient   Primary Caregiver Self   Services At/After Discharge   Transition of Care Consult (CM Consult) Home Health   Internal Home Health Yes   Services At/After Discharge Home Health   Mode of Transport at Discharge Self   Condition of Participation: Discharge Planning   The Plan for Transition of Care is related to the following treatment goals: improve mobility   The Patient and/or Patient Representative was provided with a Choice of Provider? Patient   The Patient and/Or Patient Representative agree with the Discharge Plan? Yes   Freedom of Choice list was provided with basic dialogue that supports the patient's individualized plan of care/goals, treatment preferences, and shares the quality data associated with the providers?  Yes

## 2024-03-14 NOTE — PERIOP NOTE
TRANSFER - OUT REPORT:    Verbal report given to Tish Chapin RN on Damaso Kaur  being transferred to [facility] for routine post-op       Report consisted of patient's Situation, Background, Assessment and   Recommendations(SBAR).     Information from the following report(s) Nurse Handoff Report and Cardiac Rhythm SR  was reviewed with the receiving nurse.           Lines:   Peripheral IV 03/14/24 Posterior;Right Arm (Active)   Site Assessment Clean, dry & intact 03/14/24 1315   Line Status Infusing 03/14/24 1315   Phlebitis Assessment No symptoms 03/14/24 1315   Infiltration Assessment 0 03/14/24 1315   Dressing Status Clean, dry & intact 03/14/24 1315   Dressing Type Transparent 03/14/24 1315        Opportunity for questions and clarification was provided.      Patient transported with:  Tech

## 2024-03-14 NOTE — PROGRESS NOTES
TRANSFER - IN REPORT:    Verbal report received from CELINA Blanchard on Damaso Kaur  being received from PACU for routine post-op      Report consisted of patient's Situation, Background, Assessment and   Recommendations(SBAR).     Information from the following report(s) Nurse Handoff Report was reviewed with the receiving nurse.    Opportunity for questions and clarification was provided.      Assessment completed upon patient's arrival to unit and care assumed.

## 2024-03-14 NOTE — ANESTHESIA PRE PROCEDURE
Department of Anesthesiology  Preprocedure Note       Name:  Damaso Kaur   Age:  68 y.o.  :  1955                                          MRN:  [id number]         Date:  3/14/2024      Surgeon: Surgeon(s):  Jonah Mark Jr., MD    Procedure: Procedure(s):  left reverse total shoulder arthroplasty with a delta xtend prosthesis and biceps tenodesis in combination with a latissimus dorsi and teres major tendon transfer. general/interscalene. 23hr.    Medications prior to admission:   Prior to Admission medications    Medication Sig Start Date End Date Taking? Authorizing Provider   dilTIAZem (CARDIZEM 12 HR) 120 MG extended release capsule Take 1 capsule by mouth nightly    Ray Roy MD   buPROPion (WELLBUTRIN XL) 150 MG extended release tablet Take 1 tablet by mouth 10/4/23   Ray Roy MD   FLUoxetine (PROZAC) 20 MG capsule Take 1 capsule by mouth 10/16/23   Ray Roy MD   HYDROcodone-acetaminophen (NORCO)  MG per tablet Take 1 tablet by mouth every 6 hours as needed for Pain. 23   Ray Roy MD   losartan (COZAAR) 25 MG tablet Take 1 tablet by mouth at bedtime 23   Ray Roy MD   XARELTO 20 MG TABS tablet Take 1 tablet by mouth Daily with supper 23   Ray Roy MD   spironolactone (ALDACTONE) 25 MG tablet Take 1 tablet by mouth daily 1/2 tablet in the morning 23   Ray Roy MD   traZODone (DESYREL) 100 MG tablet Take 1 tablet by mouth nightly 23   Ray Roy MD       Current medications:    Current Facility-Administered Medications   Medication Dose Route Frequency Provider Last Rate Last Admin    lidocaine 1 % injection 1 mL  1 mL IntraDERmal Once PRN Harish Moe IV, MD        lactated ringers IV soln infusion   IntraVENous Continuous Harish Moe IV,  mL/hr at 24 0827 New Bag at 24 0827    sodium chloride flush 0.9 % injection 5-40 mL  5-40 mL

## 2024-03-14 NOTE — BRIEF OP NOTE
BRIEF OPERATIVE NOTE    Date of Procedure: 3/14/2024     Preoperative Diagnosis:  GLENOHUMERAL OSTEOARTHRITIS LEFT SHOULDER    Postoperative Diagnosis:  SAME    Procedure(s)  REVERSE LEFT TOTAL SHOULDER ARTHROPLASTY WITH DELTA EXTEND PROSTHESIS, BICEPS TENODESIS    Surgeon(s) and Role:     * Jonah Mark Jr., MD - Primary         Assistant Staff:  NONE    Surgical Staff:  Dalilaulator: Mag Pino RN  Surgical Assistant: Romina Johnson  Scrub Person First: Teagan Fofana  Scrub Person Second: Lesli Bliss      * Missing procedure start or end time(s) *    Anesthesia:  GENERAL WITH INTERSCALENE BLOCK    Estimated Blood Loss: 70 CC.      Complications: NONE    Implants:   Implant Name Type Inv. Item Serial No.  Lot No. LRB No. Used Action   GLENOSPHERE ECC DELTA XTEND ECC D38MM +4MM - [id number]  GLENOSPHERE ECC DELTA XTEND ECC D38MM +4MM  [facility] Premise [facility] [id number] Left 1 Implanted   COMPONENT DANIEL FIX [id number] SHLDR METAGLENE LNG PEG GLOB - [id number]  COMPONENT DANIEL FIX [id number] SHLDR METAGLENE LNG PEG GLOB  [facility] Premise [facility] [id number] Left 1 Implanted   SCREW BNE L24MM DIA4.5MM PROX ESTRELLA TIB S STL ST PONCHO FULL - [id number]  SCREW BNE L24MM DIA4.5MM PROX ESTRELLA TIB S STL ST PONCHO FULL  [employer] [id number] Left 1 Implanted   SCREW BNE L50MM DIA4.5MM PROX ESTRELLA TIB S STL ST [place]ON[place] FULL - [id number]  SCREW BNE L50MM DIA4.5MM PROX ESTRELLA TIB S STL ST [place]ON[place] FULL  [employer] [id number] Left 1 Implanted   SCREW BNE L50MM DIA4.5MM PROX ESTRELLA TIB S STL ST [place] FULL - [id number]  SCREW BNE L50MM DIA4.5MM PROX ESTRELLA TIB S STL ST PONCHO FULL  [employer] [id number] Left 1 Implanted   STEM HUM SZ 2 [id number] 155DEG SHLDR CO CHROM ALLY STD JANA - [id number]  STEM HUM SZ 2 [id number] 155DEG SHLDR CO CHROM ALLY STD JANA  [facility] Premise [facility] [id number] Left 1 Implanted   RESTRICTOR CHALINO [id number] BIOABSRB HIP PLUG CLEARCUT - [id number]  RESTRICTOR CHALINO [id number] BIOABSRB  HIP PLUG CLEARCUT  [facility]- [id number] Left 1 Implanted   CEMENT BNE 20ML 41GM FULL DOSE PMMA W/ TOBRA M VISC RADPQ - [id number]  CEMENT BNE 20ML 41GM FULL DOSE PMMA W/ TOBRA M VISC RADPQ  [employer] [facility]- [id number] Left 1 Implanted   SPACER HUM +9MM OFFSET SHLDR POLYETH FOR DELT XTEND REV SYS - [id number]  SPACER HUM +9MM OFFSET SHLDR POLYETH FOR DELT XTEND REV SYS  [employer]- [id number] Left 1 Implanted   CUP HUM [id number] +9MM OFFSET STD SHLDR POLYETH DELT XTEND - [id number]  CUP HUM [id number] +9MM OFFSET STD SHLDR POLYETH DELT XTEND  J [employer]- [id number] Left 1 Implanted       SAMUEL ALEXIS JR, MD

## 2024-03-14 NOTE — H&P
Update History & Physical    The patient's History and Physical of February 6, 2024 was reviewed with the patient and I examined the patient. There was no change. The surgical site was confirmed by the patient and me.       Plan: The risks, benefits, expected outcome, and alternative to the recommended procedure have been discussed with the patient. Patient understands and wants to proceed with the procedure.     Electronically signed by SAMUEL ALEXIS JR, MD on 3/14/2024 at 6:40 AM

## 2024-03-14 NOTE — OP NOTE
[facility] & [address]  [address]                            OPERATIVE REPORT      PATIENT NAME: QIANA ELDER           : 1955  MED REC NO: [id number]                       ROOM: [facility]  ACCOUNT NO: [id number]                       ADMIT DATE: 2024  PROVIDER: Jonah Mark Jr, MD    DATE OF SERVICE:  2024    PREOPERATIVE DIAGNOSES:  End-stage glenohumeral osteoarthritis, left shoulder.    POSTOPERATIVE DIAGNOSES:  End-stage glenohumeral osteoarthritis, left shoulder.    PROCEDURES PERFORMED:    Reverse left total shoulder arthroplasty with the Delta Xtend prosthesis,  Biceps tenodesis.      SURGEON:  Jonah Mark Jr, MD        ANESTHESIA:  General interscalene block.    ESTIMATED BLOOD LOSS:  70 mL.      INTRAOPERATIVE FINDINGS:  End-stage glenohumeral osteoarthritis, left shoulder.         COMPLICATIONS:  None.    IMPLANTS:  Hardware utilized DePuy +10 metaglene, 38 eccentric +4 mm lateralized glenosphere, 38 +9 cup, +9 extender, 12.2 stem, 12 mm Biostop G, 50 mm superior and inferior and 26 mm anterior nonlocking cortical screws.    INDICATIONS:  The patient is a 68-year-old gentleman, who has developed severe left shoulder pain.  Preoperative physical exam and radiographs and an MRI demonstrated end-stage glenohumeral osteoarthritis of left shoulder.  The patient has exhausted of nonoperative modalities and electively admitted for operative intervention.    DESCRIPTION OF PROCEDURE:  Following identification of the patient, the patient was taken to the operative suite.  Following induction of general anesthesia, interscalene block for postop pain control, 2 g of IV Ancef, the patient was very carefully positioned on the operating table in beach chair fashion.  Left shoulder was then prepped and draped in a sterile fashion.  Standard deltopectoral incision was identified and marked.  Skin was incised.  Subcutaneous

## 2024-03-14 NOTE — DISCHARGE SUMMARY
[facility] Discharge Summary      Patient ID:  Damaso Kaur  [id number]  68 y.o.  1955    Allergies: Carvedilol, Meloxicam, Pregabalin, Aspirin, Cimetidine, and Metoclopramide    Admit date: 3/14/2024    Discharge date and time: 3/16/2024    Admitting Physician: Jonah Mark Jr., MD     Discharge Physician: JONAH MARK JR, MD      * Admission Diagnoses: Osteoarthritis of left glenohumeral joint [M19.012]  Bicipital tendinitis of left shoulder [M75.22]  Osteoarthritis of glenohumeral joint, left [M19.012]    * Discharge Diagnoses: Principal Problem:    Osteoarthritis of left glenohumeral joint  Active Problems:    Bicipital tendinitis of left shoulder    Osteoarthritis of glenohumeral joint, left  Resolved Problems:    * No resolved hospital problems. *      Surgeon: JONAH MARK JR, MD      Preoperative Medical Clearance: yes    * Procedure: Procedure(s) with comments:  left reverse total shoulder arthroplasty with a delta xtend prosthesis and biceps tenodesis in combination with a latissimus dorsi and teres major tendon transfer. general/interscalene. 23hr. - interscalene           Perioperative Antibiotics: Ancef  _x__                                                Vancomycin  ___            Post Op complications: none        * Discharge Condition: Good  Wound appears to be healing without any evidence of infection.       * Discharged to: Home    * Follow-up Care/Discharge instructions:  - Resume pre hospital diet            - Resume home medications per medical continuation form     CONTINUE PHYSICAL THERAPY  Sling left shoulder  - Follow up in office as scheduled       Signed:  JONAH MARK JR, MD  3/15/2024  6:45 AM

## 2024-03-14 NOTE — ANESTHESIA POSTPROCEDURE EVALUATION
Department of Anesthesiology  Postprocedure Note    Patient: Damaso Kaur  MRN: [id number]  YOB: 1955  Date of evaluation: 3/14/2024    Procedure Summary       Date: 03/14/24 Room / Location: [facility] MAIN OR  / [facility] MAIN OR    Anesthesia Start: 1113 Anesthesia Stop: 1316    Procedure: REVERSE LEFT TOTAL SHOULDER ARTHROPLASTY WITH DELTA EXTEND PROSTHESIS, BICEPS TENODESIS (Left: Shoulder) Diagnosis:       Osteoarthritis of left glenohumeral joint      Bicipital tendinitis of left shoulder      (Osteoarthritis of left glenohumeral joint [M19.012])      (Bicipital tendinitis of left shoulder [M75.22])    Surgeons: Jonah Mark Jr., MD Responsible Provider: Harish Moe IV, MD    Anesthesia Type: General ASA Status: 3            Anesthesia Type: General    Lyssa Phase I: Lyssa Score: 9    Lyssa Phase II:      Anesthesia Post Evaluation    Patient location during evaluation: PACU  Patient participation: complete - patient participated  Level of consciousness: sleepy but conscious and responsive to verbal stimuli  Airway patency: patent  Nausea & Vomiting: no nausea and no vomiting  Cardiovascular status: hemodynamically stable  Respiratory status: acceptable, nonlabored ventilation and spontaneous ventilation  Hydration status: euvolemic  Comments: /67   Pulse 80   Temp 98.1 °F (36.7 °C)   Resp 11   Ht 1.651 m (5' 5\")   Wt 64 kg (141 lb 3.2 oz)   SpO2 93%   BMI 23.50 kg/m²     Multimodal analgesia pain management approach  Pain management: adequate and satisfactory to patient    No notable events documented.

## 2024-03-15 ENCOUNTER — HOME HEALTH ADMISSION (OUTPATIENT)
Dept: HOME HEALTH SERVICES | Facility: HOME HEALTH | Age: 69
End: 2024-03-15
Payer: MEDICARE

## 2024-03-15 LAB
ANION GAP SERPL CALC-SCNC: 8 MMOL/L (ref 2–11)
BUN SERPL-MCNC: 21 MG/DL (ref 8–23)
CALCIUM SERPL-MCNC: 8.5 MG/DL (ref 8.3–10.4)
CHLORIDE SERPL-SCNC: 110 MMOL/L (ref 103–113)
CO2 SERPL-SCNC: 24 MMOL/L (ref 21–32)
CREAT SERPL-MCNC: 1.2 MG/DL (ref 0.8–1.5)
ERYTHROCYTE [DISTWIDTH] IN BLOOD BY AUTOMATED COUNT: 11.4 % (ref 11.9–14.6)
GLUCOSE SERPL-MCNC: 196 MG/DL (ref 65–100)
HCT VFR BLD AUTO: 33.7 % (ref 41.1–50.3)
HGB BLD-MCNC: 11.6 G/DL (ref 13.6–17.2)
MAGNESIUM SERPL-MCNC: 1.9 MG/DL (ref 1.8–2.4)
MCH RBC QN AUTO: 30.9 PG (ref 26.1–32.9)
MCHC RBC AUTO-ENTMCNC: 34.4 G/DL (ref 31.4–35)
MCV RBC AUTO: 89.6 FL (ref 82–102)
NRBC # BLD: 0 K/UL (ref 0–0.2)
PLATELET # BLD AUTO: 182 K/UL (ref 150–450)
PMV BLD AUTO: 9.9 FL (ref 9.4–12.3)
POTASSIUM SERPL-SCNC: 4 MMOL/L (ref 3.5–5.1)
RBC # BLD AUTO: 3.76 M/UL (ref 4.23–5.6)
SODIUM SERPL-SCNC: 142 MMOL/L (ref 136–146)
WBC # BLD AUTO: 11.1 K/UL (ref 4.3–11.1)

## 2024-03-15 PROCEDURE — 94760 N-INVAS EAR/PLS OXIMETRY 1: CPT

## 2024-03-15 PROCEDURE — 36415 COLL VENOUS BLD VENIPUNCTURE: CPT

## 2024-03-15 PROCEDURE — 2700000000 HC OXYGEN THERAPY PER DAY

## 2024-03-15 PROCEDURE — 6370000000 HC RX 637 (ALT 250 FOR IP): Performed by: ORTHOPAEDIC SURGERY

## 2024-03-15 PROCEDURE — 85027 COMPLETE CBC AUTOMATED: CPT

## 2024-03-15 PROCEDURE — 94761 N-INVAS EAR/PLS OXIMETRY MLT: CPT

## 2024-03-15 PROCEDURE — 2580000003 HC RX 258: Performed by: ORTHOPAEDIC SURGERY

## 2024-03-15 PROCEDURE — 97530 THERAPEUTIC ACTIVITIES: CPT

## 2024-03-15 PROCEDURE — 96374 THER/PROPH/DIAG INJ IV PUSH: CPT

## 2024-03-15 PROCEDURE — 80048 BASIC METABOLIC PNL TOTAL CA: CPT

## 2024-03-15 PROCEDURE — G0378 HOSPITAL OBSERVATION PER HR: HCPCS

## 2024-03-15 PROCEDURE — 6360000002 HC RX W HCPCS: Performed by: ORTHOPAEDIC SURGERY

## 2024-03-15 PROCEDURE — 97161 PT EVAL LOW COMPLEX 20 MIN: CPT

## 2024-03-15 PROCEDURE — 96376 TX/PRO/DX INJ SAME DRUG ADON: CPT

## 2024-03-15 PROCEDURE — 83735 ASSAY OF MAGNESIUM: CPT

## 2024-03-15 RX ORDER — OXYCODONE HYDROCHLORIDE 5 MG/1
10 CAPSULE ORAL
Status: DISCONTINUED | OUTPATIENT
Start: 2024-03-15 | End: 2024-03-16 | Stop reason: HOSPADM

## 2024-03-15 RX ORDER — PROMETHAZINE HYDROCHLORIDE 25 MG/1
25 TABLET ORAL EVERY 6 HOURS PRN
Qty: 20 TABLET | Refills: 0 | Status: SHIPPED | OUTPATIENT
Start: 2024-03-15

## 2024-03-15 RX ORDER — MORPHINE SULFATE 2 MG/ML
2 INJECTION, SOLUTION INTRAMUSCULAR; INTRAVENOUS
Status: DISCONTINUED | OUTPATIENT
Start: 2024-03-15 | End: 2024-03-16 | Stop reason: HOSPADM

## 2024-03-15 RX ORDER — OXYCODONE HYDROCHLORIDE 5 MG/1
20 CAPSULE ORAL
Status: DISCONTINUED | OUTPATIENT
Start: 2024-03-15 | End: 2024-03-16 | Stop reason: HOSPADM

## 2024-03-15 RX ORDER — OXYCODONE HYDROCHLORIDE 10 MG/1
10 TABLET ORAL EVERY 6 HOURS PRN
Qty: 40 TABLET | Refills: 0 | Status: SHIPPED | OUTPATIENT
Start: 2024-03-15 | End: 2024-03-25

## 2024-03-15 RX ORDER — NALOXONE HYDROCHLORIDE 4 MG/.1ML
1 SPRAY NASAL PRN
Qty: 1 EACH | Refills: 0 | Status: SHIPPED | OUTPATIENT
Start: 2024-03-15

## 2024-03-15 RX ORDER — MORPHINE SULFATE 2 MG/ML
2 INJECTION, SOLUTION INTRAMUSCULAR; INTRAVENOUS
Status: DISCONTINUED | OUTPATIENT
Start: 2024-03-15 | End: 2024-03-15

## 2024-03-15 RX ADMIN — SPIRONOLACTONE 25 MG: 25 TABLET ORAL at 08:14

## 2024-03-15 RX ADMIN — SODIUM CHLORIDE, PRESERVATIVE FREE 10 ML: 5 INJECTION INTRAVENOUS at 21:06

## 2024-03-15 RX ADMIN — FERROUS SULFATE TAB 325 MG (65 MG ELEMENTAL FE) 325 MG: 325 (65 FE) TAB at 18:08

## 2024-03-15 RX ADMIN — OXYCODONE HYDROCHLORIDE 20 MG: 5 CAPSULE ORAL at 19:11

## 2024-03-15 RX ADMIN — MORPHINE SULFATE 2 MG: 2 INJECTION, SOLUTION INTRAMUSCULAR; INTRAVENOUS at 04:35

## 2024-03-15 RX ADMIN — HYDROMORPHONE HYDROCHLORIDE 1 MG: 1 INJECTION, SOLUTION INTRAMUSCULAR; INTRAVENOUS; SUBCUTANEOUS at 01:57

## 2024-03-15 RX ADMIN — OXYCODONE HYDROCHLORIDE 20 MG: 5 CAPSULE ORAL at 08:15

## 2024-03-15 RX ADMIN — FERROUS SULFATE TAB 325 MG (65 MG ELEMENTAL FE) 325 MG: 325 (65 FE) TAB at 08:14

## 2024-03-15 RX ADMIN — OXYCODONE HYDROCHLORIDE 20 MG: 5 CAPSULE ORAL at 21:54

## 2024-03-15 RX ADMIN — CEFAZOLIN 1000 MG: 1 INJECTION, POWDER, FOR SOLUTION INTRAMUSCULAR; INTRAVENOUS at 03:35

## 2024-03-15 RX ADMIN — OXYCODONE HYDROCHLORIDE 20 MG: 5 CAPSULE ORAL at 15:11

## 2024-03-15 RX ADMIN — DOCUSATE SODIUM 100 MG: 100 CAPSULE, LIQUID FILLED ORAL at 08:15

## 2024-03-15 RX ADMIN — DOCUSATE SODIUM 100 MG: 100 CAPSULE, LIQUID FILLED ORAL at 21:06

## 2024-03-15 RX ADMIN — DILTIAZEM HYDROCHLORIDE 120 MG: 30 TABLET, FILM COATED ORAL at 21:06

## 2024-03-15 RX ADMIN — RIVAROXABAN 20 MG: 20 TABLET, FILM COATED ORAL at 18:08

## 2024-03-15 RX ADMIN — HYDROMORPHONE HYDROCHLORIDE 1 MG: 1 INJECTION, SOLUTION INTRAMUSCULAR; INTRAVENOUS; SUBCUTANEOUS at 03:35

## 2024-03-15 RX ADMIN — HYDROMORPHONE HYDROCHLORIDE 4 MG: 4 TABLET ORAL at 00:45

## 2024-03-15 RX ADMIN — BUPROPION HYDROCHLORIDE 150 MG: 150 TABLET, EXTENDED RELEASE ORAL at 08:15

## 2024-03-15 RX ADMIN — FLUOXETINE HYDROCHLORIDE 20 MG: 20 CAPSULE ORAL at 09:39

## 2024-03-15 RX ADMIN — LOSARTAN POTASSIUM 25 MG: 25 TABLET, FILM COATED ORAL at 21:06

## 2024-03-15 RX ADMIN — TRAZODONE HYDROCHLORIDE 100 MG: 50 TABLET ORAL at 21:06

## 2024-03-15 RX ADMIN — OXYCODONE HYDROCHLORIDE 20 MG: 5 CAPSULE ORAL at 11:08

## 2024-03-15 RX ADMIN — CEFAZOLIN 1000 MG: 1 INJECTION, POWDER, FOR SOLUTION INTRAMUSCULAR; INTRAVENOUS at 11:37

## 2024-03-15 ASSESSMENT — PAIN DESCRIPTION - LOCATION
LOCATION: SHOULDER

## 2024-03-15 ASSESSMENT — PAIN DESCRIPTION - DESCRIPTORS
DESCRIPTORS: ACHING;CRUSHING
DESCRIPTORS: ACHING

## 2024-03-15 ASSESSMENT — PAIN DESCRIPTION - ORIENTATION
ORIENTATION: LEFT

## 2024-03-15 ASSESSMENT — PAIN SCALES - GENERAL
PAINLEVEL_OUTOF10: 3
PAINLEVEL_OUTOF10: 9
PAINLEVEL_OUTOF10: 3
PAINLEVEL_OUTOF10: 6
PAINLEVEL_OUTOF10: 6
PAINLEVEL_OUTOF10: 9
PAINLEVEL_OUTOF10: 8
PAINLEVEL_OUTOF10: 2
PAINLEVEL_OUTOF10: 2
PAINLEVEL_OUTOF10: 9
PAINLEVEL_OUTOF10: 10
PAINLEVEL_OUTOF10: 5
PAINLEVEL_OUTOF10: 6
PAINLEVEL_OUTOF10: 4
PAINLEVEL_OUTOF10: 10

## 2024-03-15 NOTE — PROGRESS NOTES
ACUTE PHYSICAL THERAPY GOALS:   (Developed with and agreed upon by patient and/or caregiver.)  GOALS (1-4 days):  (1.)  Patient will move from supine to sit and sit to supine  in bed with SUPERVISION.    (2.)  Patient will transfer from bed to chair and chair to bed with SUPERVISION using the least restrictive device.    (3.)  Patient will ambulate with SUPERVISION for 200 feet with the least restrictive device.   (4.)  Patient will be independent with shoulder HEP to increase range of motion per MD orders.  ________________________________________________________________________________________________      PHYSICAL THERAPY: SHOULDER Daily Note and PM  (Link to Caseload Tracking: PT Visit Days : 1  Acknowledge Orders Time In/Out  PT Charge Capture  Rehab Caseload Tracker    Damaso Kaur is a 68 y.o. male   PRIMARY DIAGNOSIS: Osteoarthritis of left glenohumeral joint  Osteoarthritis of left glenohumeral joint [M19.012]  Bicipital tendinitis of left shoulder [M75.22]  Osteoarthritis of glenohumeral joint, left [M19.012]  Procedure(s) (LRB):  REVERSE LEFT TOTAL SHOULDER ARTHROPLASTY WITH DELTA EXTEND PROSTHESIS, BICEPS TENODESIS (Left)  1 Day Post-Op  Reason for Referral: Pain in Left Shoulder (M25.512)  Stiffness of Left Shoulder, Not elsewhere classified (M25.612)  Other abnormalities of gait and mobility (R26.89)  Observation: Payor: MEDICARE / Plan: MEDICARE PART A AND B / Product Type: *No Product type* /     MOVEMENT PRECAUTIONS   TWICE A DAY  Active and passive range of motion left elbow hand  Active assisted and passive range of motion left Shoulder to tolerance  Pulleys and pendulums  Do not push motion  nwb left shoulder  wbat bilateral lower extremity  Sling left shoulder        REHAB RECOMMENDATIONS:   Recommendation to date pending progress:  Setting:  Home Health Therapy    Equipment:     Has sling     ASSESSMENT:   ASSESSMENT:  Mr. Kaur presents with decreased functional mobility and  [x] [x] [] [] [] [] [] []    Stand Pivot [] [] [] [] [] [] [] [] [] [] []    Toilet [] [] [] [] [] [] [] [] [] [] []     [] [] [] [] [] [] [] [] [] [] []    I=Independent, Mod I=Modified Independent, S=Supervision, SBA=Standby Assistance, CGA=Contact Guard Assistance,   Min=Minimal Assistance, Mod=Moderate Assistance, Max=Maximal Assistance, Total=Total Assistance, NT=Not Tested    GAIT: I Mod I S SBA CGA Min Mod Max Total  NT x2 Comments:   Level of Assistance [] [] [] [x] [x] [] [] [] [] [] []    Weightbearing Status  Restrictions/Precautions  Restrictions/Precautions: Weight Bearing, ROM Restrictions, Surgical Protocols    Distance  200 feet    Gait Quality Decreased blaze  and Decreased step length    DME None     Stairs      I=Independent, Mod I=Modified Independent, S=Supervision, SBA=Standby Assistance, CGA=Contact Guard Assistance,   Min=Minimal Assistance, Mod=Moderate Assistance, Max=Maximal Assistance, Total=Total Assistance, NT=Not Tested    BALANCE: Good Fair+ Fair Fair- Poor NT Comments   Sitting Static [x] [] [] [] [] []    Sitting Dynamic [] [x] [] [] [] []              Standing Static [] [x] [] [] [] []    Standing Dynamic [] [] [x] [] [] []      PLAN:   FREQUENCY AND DURATION: BID for duration of hospital stay or until stated goals are met, whichever comes first.    THERAPY PROGNOSIS: Good    PROBLEM LIST:   (Skilled intervention is medically necessary to address:)  Decreased ADL/Functional Activities  Decreased Activity Tolerance  Decreased AROM/PROM  Decreased Balance  Decreased Gait Ability  Decreased Safety Awareness  Decreased Strength  Decreased Transfer Abilities  Increased Pain   INTERVENTIONS PLANNED:   (Benefits and precautions of physical therapy have been discussed with the patient.)  Therapeutic Activity  Therapeutic Exercise/HEP  Neuromuscular Re-education  Gait Training  Manual Therapy  Education       TREATMENT:      TREATMENT:   Therapeutic Activity (33 Minutes): Therapeutic

## 2024-03-15 NOTE — PROGRESS NOTES
Orthopedic Joint Progress Note    March 15, 2024  Admit Date: 3/14/2024  Admit Diagnosis: Osteoarthritis of left glenohumeral joint [M19.012]  Bicipital tendinitis of left shoulder [M75.22]  Osteoarthritis of glenohumeral joint, left [M19.012]    1 Day Post-Op    Subjective: complains of severe pain dilaudid not working     Damaso Kaur     Review of Systems: pertinent items are noted in HPI    Objective:     PT/OT:     PATIENT MOBILITY                           Vital Signs:    Blood pressure 135/89, pulse 80, temperature 98.5 °F (36.9 °C), temperature source Oral, resp. rate 18, height 1.651 m (5' 5\"), weight 64 kg (141 lb 3.2 oz), SpO2 94 %.  Temp (24hrs), Av.7 °F (37.1 °C), Min:97.8 °F (36.6 °C), Max:100 °F (37.8 °C)      Pain Control:        Meds:  Current Facility-Administered Medications   Medication Dose Route Frequency    oxyCODONE capsule 10 mg  10 mg Oral Q3H PRN    oxyCODONE (ROXICODONE) immediate release tablet 20 mg  20 mg Oral Q3H PRN    morphine injection 2 mg  2 mg IntraVENous Q2H PRN    albuterol (PROVENTIL) (2.5 MG/3ML) 0.083% nebulizer solution 2.5 mg  2.5 mg Nebulization Q6H PRN    docusate sodium (COLACE) capsule 100 mg  100 mg Oral BID    bisacodyl (DULCOLAX) suppository 10 mg  10 mg Rectal Daily PRN    ondansetron (ZOFRAN-ODT) disintegrating tablet 4 mg  4 mg Oral Q8H PRN    promethazine (PHENERGAN) tablet 25 mg  25 mg Oral Q4H PRN    sodium phosphate (FLEET) rectal enema 1 enema  1 enema Rectal Once PRN    ferrous sulfate (IRON 325) tablet 325 mg  325 mg Oral BID WC    sodium chloride flush 0.9 % injection 5-40 mL  5-40 mL IntraVENous 2 times per day    sodium chloride flush 0.9 % injection 5-40 mL  5-40 mL IntraVENous PRN    0.9 % sodium chloride infusion   IntraVENous PRN    buPROPion (WELLBUTRIN XL) extended release tablet 150 mg  150 mg Oral Daily    dilTIAZem (CARDIZEM) tablet 120 mg  120 mg Oral Nightly    FLUoxetine (PROZAC) capsule 20 mg  20 mg Oral Daily    losartan

## 2024-03-15 NOTE — PROGRESS NOTES
ACUTE PHYSICAL THERAPY GOALS:   (Developed with and agreed upon by patient and/or caregiver.)  GOALS (1-4 days):  (1.)  Patient will move from supine to sit and sit to supine  in bed with SUPERVISION.    (2.)  Patient will transfer from bed to chair and chair to bed with SUPERVISION using the least restrictive device.    (3.)  Patient will ambulate with SUPERVISION for 200 feet with the least restrictive device.   (4.)  Patient will be independent with shoulder HEP to increase range of motion per MD orders.  ________________________________________________________________________________________________      PHYSICAL THERAPY: SHOULDER Initial Assessment and AM  (Link to Caseload Tracking: PT Visit Days : 1  Acknowledge Orders Time In/Out  PT Charge Capture  Rehab Caseload Tracker    Damaso Kaur is a 68 y.o. male   PRIMARY DIAGNOSIS: Osteoarthritis of left glenohumeral joint  Osteoarthritis of left glenohumeral joint [M19.012]  Bicipital tendinitis of left shoulder [M75.22]  Osteoarthritis of glenohumeral joint, left [M19.012]  Procedure(s) (LRB):  REVERSE LEFT TOTAL SHOULDER ARTHROPLASTY WITH DELTA EXTEND PROSTHESIS, BICEPS TENODESIS (Left)  1 Day Post-Op  Reason for Referral: Pain in Left Shoulder (M25.512)  Stiffness of Left Shoulder, Not elsewhere classified (M25.612)  Other abnormalities of gait and mobility (R26.89)  Observation: Payor: MEDICARE / Plan: MEDICARE PART A AND B / Product Type: *No Product type* /     MOVEMENT PRECAUTIONS   TWICE A DAY  Active and passive range of motion left elbow hand  Active assisted and passive range of motion left Shoulder to tolerance  Pulleys and pendulums  Do not push motion  nwb left shoulder  wbat bilateral lower extremity  Sling left shoulder        REHAB RECOMMENDATIONS:   Recommendation to date pending progress:  Setting:  Home Health Therapy    Equipment:     Has sling     ASSESSMENT:   ASSESSMENT:  Mr. Kaur presents with decreased functional mobility and

## 2024-03-16 VITALS
OXYGEN SATURATION: 94 % | DIASTOLIC BLOOD PRESSURE: 65 MMHG | HEART RATE: 84 BPM | HEIGHT: 65 IN | TEMPERATURE: 99 F | WEIGHT: 141.2 LBS | SYSTOLIC BLOOD PRESSURE: 116 MMHG | RESPIRATION RATE: 18 BRPM | BODY MASS INDEX: 23.53 KG/M2

## 2024-03-16 LAB
ANION GAP SERPL CALC-SCNC: 4 MMOL/L (ref 2–11)
BUN SERPL-MCNC: 16 MG/DL (ref 8–23)
CALCIUM SERPL-MCNC: 8.5 MG/DL (ref 8.3–10.4)
CHLORIDE SERPL-SCNC: 108 MMOL/L (ref 103–113)
CO2 SERPL-SCNC: 30 MMOL/L (ref 21–32)
CREAT SERPL-MCNC: 0.89 MG/DL (ref 0.8–1.5)
ERYTHROCYTE [DISTWIDTH] IN BLOOD BY AUTOMATED COUNT: 11.7 % (ref 11.9–14.6)
GLUCOSE SERPL-MCNC: 127 MG/DL (ref 65–100)
HCT VFR BLD AUTO: 33.3 % (ref 41.1–50.3)
HGB BLD-MCNC: 11.4 G/DL (ref 13.6–17.2)
MAGNESIUM SERPL-MCNC: 2 MG/DL (ref 1.8–2.4)
MCH RBC QN AUTO: 31.3 PG (ref 26.1–32.9)
MCHC RBC AUTO-ENTMCNC: 34.2 G/DL (ref 31.4–35)
MCV RBC AUTO: 91.5 FL (ref 82–102)
NRBC # BLD: 0 K/UL (ref 0–0.2)
PLATELET # BLD AUTO: 177 K/UL (ref 150–450)
PMV BLD AUTO: 9.6 FL (ref 9.4–12.3)
POTASSIUM SERPL-SCNC: 4 MMOL/L (ref 3.5–5.1)
RBC # BLD AUTO: 3.64 M/UL (ref 4.23–5.6)
SODIUM SERPL-SCNC: 142 MMOL/L (ref 136–146)
WBC # BLD AUTO: 9.1 K/UL (ref 4.3–11.1)

## 2024-03-16 PROCEDURE — 97530 THERAPEUTIC ACTIVITIES: CPT

## 2024-03-16 PROCEDURE — 80048 BASIC METABOLIC PNL TOTAL CA: CPT

## 2024-03-16 PROCEDURE — G0378 HOSPITAL OBSERVATION PER HR: HCPCS

## 2024-03-16 PROCEDURE — 83735 ASSAY OF MAGNESIUM: CPT

## 2024-03-16 PROCEDURE — 36415 COLL VENOUS BLD VENIPUNCTURE: CPT

## 2024-03-16 PROCEDURE — 85027 COMPLETE CBC AUTOMATED: CPT

## 2024-03-16 PROCEDURE — 94760 N-INVAS EAR/PLS OXIMETRY 1: CPT

## 2024-03-16 PROCEDURE — 6370000000 HC RX 637 (ALT 250 FOR IP): Performed by: ORTHOPAEDIC SURGERY

## 2024-03-16 RX ADMIN — OXYCODONE HYDROCHLORIDE 20 MG: 5 CAPSULE ORAL at 01:00

## 2024-03-16 RX ADMIN — OXYCODONE HYDROCHLORIDE 20 MG: 5 CAPSULE ORAL at 04:15

## 2024-03-16 RX ADMIN — BUPROPION HYDROCHLORIDE 150 MG: 150 TABLET, EXTENDED RELEASE ORAL at 08:39

## 2024-03-16 RX ADMIN — FLUOXETINE HYDROCHLORIDE 20 MG: 20 CAPSULE ORAL at 08:39

## 2024-03-16 RX ADMIN — DOCUSATE SODIUM 100 MG: 100 CAPSULE, LIQUID FILLED ORAL at 08:39

## 2024-03-16 RX ADMIN — FERROUS SULFATE TAB 325 MG (65 MG ELEMENTAL FE) 325 MG: 325 (65 FE) TAB at 08:39

## 2024-03-16 RX ADMIN — OXYCODONE HYDROCHLORIDE 20 MG: 5 CAPSULE ORAL at 09:59

## 2024-03-16 RX ADMIN — SPIRONOLACTONE 25 MG: 25 TABLET ORAL at 08:39

## 2024-03-16 ASSESSMENT — PAIN DESCRIPTION - ORIENTATION
ORIENTATION: LEFT
ORIENTATION: LEFT

## 2024-03-16 ASSESSMENT — PAIN SCALES - GENERAL
PAINLEVEL_OUTOF10: 2
PAINLEVEL_OUTOF10: 6
PAINLEVEL_OUTOF10: 0
PAINLEVEL_OUTOF10: 6
PAINLEVEL_OUTOF10: 2
PAINLEVEL_OUTOF10: 6
PAINLEVEL_OUTOF10: 0

## 2024-03-16 ASSESSMENT — PAIN DESCRIPTION - DESCRIPTORS: DESCRIPTORS: ACHING

## 2024-03-16 ASSESSMENT — PAIN DESCRIPTION - LOCATION
LOCATION: SHOULDER
LOCATION: SHOULDER

## 2024-03-16 NOTE — PROGRESS NOTES
2024         Post Op day: 2 Days Post-Op     Admit Date: 3/14/2024  Admit Diagnosis: Osteoarthritis of left glenohumeral joint [M19.012]  Bicipital tendinitis of left shoulder [M75.22]  Osteoarthritis of glenohumeral joint, left [M19.012]        Subjective: Patient is status-post Procedure(s) (LRB):  REVERSE LEFT TOTAL SHOULDER ARTHROPLASTY WITH DELTA EXTEND PROSTHESIS, BICEPS TENODESIS (Left). Patient doing well. Pain better controlled today, improving. No concerns/complaints. No shortness of breath, chest pain or nausea/vomiting.      Objective:   Vitals:    24 0959   BP:    Pulse:    Resp: 20   Temp:    SpO2:     Temp (24hrs), Av.8 °F (37.1 °C), Min:98.6 °F (37 °C), Max:99 °F (37.2 °C)    Lab Results   Component Value Date/Time    HGB 11.4 2024 04:37 AM     Extremity Exam  Left shoulder Dressing Clean, dry, intact.  Neuro intact and unchanged Left upper  extremity  Sensation intact to light touch  Extremity perfused  No sign of DVT     Assessment / Plan :  S/p Procedure(s) (LRB):  REVERSE LEFT TOTAL SHOULDER ARTHROPLASTY WITH DELTA EXTEND PROSTHESIS, BICEPS TENODESIS (Left)  Continue current postoperative plan  PT/OT-Continue current weightbearing status and restrictions of involved extremities  Continue current VTE prophylaxis  DIspo-Home today  Patient Active Problem List   Diagnosis    Chondromalacia of patella    Tear of lateral cartilage or meniscus of knee, current    Superior glenoid labrum lesion    Chondromalacia    Adhesive capsulitis of shoulder    Osteoarthritis of left glenohumeral joint    Bicipital tendinitis of left shoulder    Osteoarthritis of glenohumeral joint, left          Signed By: Pawel Miller MD

## 2024-03-16 NOTE — DISCHARGE INSTRUCTIONS
Resume pre hospital diet.  Okay to shower.  No driving until cleared by MD.  Use stool softeners/laxatives while taking narcotics.  Continue exercises as taught by PT.  Keep scheduled follow up appointment.  Call office if temp>101, increased redness or drainage at incision site, or persistent nausea/vomitting.

## 2024-03-18 ENCOUNTER — HOME CARE VISIT (OUTPATIENT)
Dept: SCHEDULING | Facility: HOME HEALTH | Age: 69
End: 2024-03-18

## 2024-03-18 VITALS
TEMPERATURE: 98.6 F | BODY MASS INDEX: 23.32 KG/M2 | WEIGHT: 140 LBS | SYSTOLIC BLOOD PRESSURE: 128 MMHG | HEIGHT: 65 IN | RESPIRATION RATE: 16 BRPM | OXYGEN SATURATION: 95 % | HEART RATE: 90 BPM | DIASTOLIC BLOOD PRESSURE: 66 MMHG

## 2024-03-18 PROCEDURE — G0151 HHCP-SERV OF PT,EA 15 MIN: HCPCS

## 2024-03-18 PROCEDURE — 0221000100 HH NO PAY CLAIM PROCEDURE

## 2024-03-18 ASSESSMENT — ENCOUNTER SYMPTOMS: DYSPNEA ACTIVITY LEVEL: AFTER AMBULATING MORE THAN 20 FT

## 2024-03-19 NOTE — DISCHARGE SUMMARY
[facility] & [address]  [address]                            DISCHARGE SUMMARY      PATIENT NAME: QIANA ELDER           : 1955  MED REC NO: [id number]                       ROOM: [facility]  ACCOUNT NO: [id number]                       ADMIT DATE: 2024  PROVIDER: Jonah Mark Jr, MD    DISCHARGE DATE:  2024    ATTENDING PHYSICIAN:  Jonah Mark Jr, MD    ADMISSION DIAGNOSIS:  End-stage glenohumeral osteoarthritis, left shoulder.    DISCHARGE DIAGNOSIS:  End-stage glenohumeral osteoarthritis, left shoulder.    Please see H and P, operative summaries, and consult for details.    The patient is a 68-year-old gentleman who was admitted on 2024 and underwent an uncomplicated reverse left total shoulder arthroplasty with the Delta Xtend prosthesis, biceps tenodesis.  On postop day #1, he was afebrile.  Vital signs were stable.  His labs were within normal limits.  His pain was poorly controlled.  He was started back on his Xarelto.  He was kept an additional night for pain control.  On postop day #2, all labs within normal limits and his pain was controlled.  He was discharged home on postoperative day #2.  He will continue therapy on the outside and follow up in my office in 10 days.        JONAH MARK JR, MD      AGP/AQS  D:  2024 19:11:59  T:  2024 21:58:06  JOB #:  [id number]    CC:   Jonah Mark Jr, MD

## 2024-03-20 ENCOUNTER — HOME CARE VISIT (OUTPATIENT)
Dept: SCHEDULING | Facility: HOME HEALTH | Age: 69
End: 2024-03-20

## 2024-03-20 VITALS
TEMPERATURE: 97.7 F | DIASTOLIC BLOOD PRESSURE: 74 MMHG | OXYGEN SATURATION: 95 % | RESPIRATION RATE: 17 BRPM | SYSTOLIC BLOOD PRESSURE: 112 MMHG | HEART RATE: 80 BPM

## 2024-03-20 PROCEDURE — G0157 HHC PT ASSISTANT EA 15: HCPCS

## 2024-03-22 ENCOUNTER — HOME CARE VISIT (OUTPATIENT)
Dept: SCHEDULING | Facility: HOME HEALTH | Age: 69
End: 2024-03-22

## 2024-03-22 VITALS
OXYGEN SATURATION: 95 % | DIASTOLIC BLOOD PRESSURE: 68 MMHG | RESPIRATION RATE: 17 BRPM | HEART RATE: 64 BPM | TEMPERATURE: 97.5 F | SYSTOLIC BLOOD PRESSURE: 112 MMHG

## 2024-03-22 PROCEDURE — G0157 HHC PT ASSISTANT EA 15: HCPCS

## 2024-03-22 ASSESSMENT — ENCOUNTER SYMPTOMS: PAIN LOCATION - PAIN QUALITY: THROBBING, BURNING

## 2024-03-25 ENCOUNTER — HOME CARE VISIT (OUTPATIENT)
Dept: SCHEDULING | Facility: HOME HEALTH | Age: 69
End: 2024-03-25
Payer: MEDICARE

## 2024-03-25 VITALS
SYSTOLIC BLOOD PRESSURE: 120 MMHG | HEART RATE: 74 BPM | DIASTOLIC BLOOD PRESSURE: 78 MMHG | OXYGEN SATURATION: 95 % | TEMPERATURE: 98.6 F | RESPIRATION RATE: 18 BRPM

## 2024-03-25 PROCEDURE — G0157 HHC PT ASSISTANT EA 15: HCPCS

## 2024-03-25 ASSESSMENT — ENCOUNTER SYMPTOMS: PAIN LOCATION - PAIN QUALITY: THROBBING

## 2024-03-27 ENCOUNTER — OFFICE VISIT (OUTPATIENT)
Dept: ORTHOPEDIC SURGERY | Age: 69
End: 2024-03-27

## 2024-03-27 DIAGNOSIS — Z96.612 PRESENCE OF LEFT ARTIFICIAL SHOULDER JOINT: Primary | ICD-10-CM

## 2024-03-27 DIAGNOSIS — Z47.1 AFTERCARE FOLLOWING LEFT SHOULDER JOINT REPLACEMENT SURGERY: ICD-10-CM

## 2024-03-27 DIAGNOSIS — Z96.612 AFTERCARE FOLLOWING LEFT SHOULDER JOINT REPLACEMENT SURGERY: ICD-10-CM

## 2024-03-27 PROCEDURE — 99024 POSTOP FOLLOW-UP VISIT: CPT | Performed by: ORTHOPAEDIC SURGERY

## 2024-03-27 RX ORDER — OXYCODONE HYDROCHLORIDE 10 MG/1
10 TABLET ORAL EVERY 6 HOURS PRN
Qty: 40 TABLET | Refills: 0 | Status: SHIPPED | OUTPATIENT
Start: 2024-03-27 | End: 2024-04-06

## 2024-03-27 RX ORDER — NALOXONE HYDROCHLORIDE 4 MG/.1ML
1 SPRAY NASAL PRN
Qty: 1 EACH | Refills: 0 | Status: SHIPPED | OUTPATIENT
Start: 2024-03-27

## 2024-03-27 NOTE — PROGRESS NOTES
Name: Damaso Kaur  YOB: 1955  Gender: male  MRN: [id number]              HPI: Damaso Kaur is a 68 y.o. right-hand-dominant male 2 weeks status post reverse left total shoulder arthroplasty with a delta xtend prosthesis biceps tenodesis.  He returns and notes that he is sore and bruised.  He is on Xarelto for atrial fibrillation      ROS/Meds/PSH/PMH/FH/SH: A ten system review of systems was performed and is negative other than what is in the HPI.   Tobacco:  reports that he has never smoked. He has never used smokeless tobacco.  There were no vitals taken for this visit.     Physical Examination:  He is an awake alert pleasant gentleman ambulating without difficulty  He has a restricted range of cervical spine motion without radicular findings    His right shoulder has well-healed incisions  The right shoulder has 0 to 180 degrees of active and 0 to 180 degrees passive forward elevation.   Mild overhead pain  Internal rotation is to T6.  External rotation is to 60 degrees at the side.   In the 90 degree abducted position 90 degrees of external and 90 degrees internal rotation  The AC joint is non-tender  SC joint is non-tender.   Greater tuberosity is non-tender.  negative biceps  Negative O'Briens sign  negative lift-off sign  Negative belly press sign  Negative bear huggers sign  negative drop sign  negative hornblower's sign  No posterior glenohumeral joint line tenderness.   No evident excessive external rotation  Rotator cuff strength is 5/5.  negative external rotation stress test.   Negative empty can sign  There is no evident anterior or posterior apprehension with a negative sulcus sign.   No instability  negative external and internal Rotation lag sign  Neurovascularly intact.    His left shoulder deltopectoral incision is intact  He has a hematoma  The dressing was changed  Active forward elevation 0-60  Biceps has good cosmetic appearance  He is neurovascular

## 2024-03-28 ENCOUNTER — HOME CARE VISIT (OUTPATIENT)
Dept: SCHEDULING | Facility: HOME HEALTH | Age: 69
End: 2024-03-28
Payer: MEDICARE

## 2024-03-28 VITALS
TEMPERATURE: 97.4 F | SYSTOLIC BLOOD PRESSURE: 110 MMHG | HEART RATE: 72 BPM | RESPIRATION RATE: 14 BRPM | DIASTOLIC BLOOD PRESSURE: 68 MMHG | OXYGEN SATURATION: 97 %

## 2024-03-28 PROCEDURE — G0151 HHCP-SERV OF PT,EA 15 MIN: HCPCS

## 2024-03-28 ASSESSMENT — ENCOUNTER SYMPTOMS: PAIN LOCATION - PAIN QUALITY: ACHES, SORE

## 2024-04-03 ENCOUNTER — OFFICE VISIT (OUTPATIENT)
Dept: ORTHOPEDIC SURGERY | Age: 69
End: 2024-04-03

## 2024-04-03 DIAGNOSIS — Z96.612 AFTERCARE FOLLOWING LEFT SHOULDER JOINT REPLACEMENT SURGERY: ICD-10-CM

## 2024-04-03 DIAGNOSIS — Z96.612 PRESENCE OF LEFT ARTIFICIAL SHOULDER JOINT: Primary | ICD-10-CM

## 2024-04-03 DIAGNOSIS — Z47.1 AFTERCARE FOLLOWING LEFT SHOULDER JOINT REPLACEMENT SURGERY: ICD-10-CM

## 2024-04-03 PROCEDURE — 99024 POSTOP FOLLOW-UP VISIT: CPT | Performed by: ORTHOPAEDIC SURGERY

## 2024-04-03 RX ORDER — OXYCODONE HYDROCHLORIDE 10 MG/1
10 TABLET ORAL EVERY 6 HOURS PRN
Qty: 40 TABLET | Refills: 0 | Status: SHIPPED | OUTPATIENT
Start: 2024-04-06 | End: 2024-04-16

## 2024-04-03 RX ORDER — NALOXONE HYDROCHLORIDE 4 MG/.1ML
1 SPRAY NASAL PRN
Qty: 1 EACH | Refills: 0 | Status: SHIPPED | OUTPATIENT
Start: 2024-04-03

## 2024-04-03 NOTE — PROGRESS NOTES
Name: Damaso Kaur  YOB: 1955  Gender: male  MRN: [id number]              HPI: Damaso Kaur is a 68 y.o. right-hand-dominant male 3 weeks status post reverse left total shoulder arthroplasty with a delta xtend prosthesis biceps tenodesis.  He returns and notes that he is doing better.  He is on Xarelto for atrial fibrillation      ROS/Meds/PSH/PMH/FH/SH: A ten system review of systems was performed and is negative other than what is in the HPI.   Tobacco:  reports that he has never smoked. He has never used smokeless tobacco.  There were no vitals taken for this visit.     Physical Examination:  He is an awake alert pleasant gentleman ambulating without difficulty  He has a restricted range of cervical spine motion without radicular findings    His right shoulder has well-healed incisions  The right shoulder has 0 to 180 degrees of active and 0 to 180 degrees passive forward elevation.   Mild overhead pain  Internal rotation is to T6.  External rotation is to 60 degrees at the side.   In the 90 degree abducted position 90 degrees of external and 90 degrees internal rotation  The AC joint is non-tender  SC joint is non-tender.   Greater tuberosity is non-tender.  negative biceps  Negative O'Briens sign  negative lift-off sign  Negative belly press sign  Negative bear huggers sign  negative drop sign  negative hornblower's sign  No posterior glenohumeral joint line tenderness.   No evident excessive external rotation  Rotator cuff strength is 5/5.  negative external rotation stress test.   Negative empty can sign  There is no evident anterior or posterior apprehension with a negative sulcus sign.   No instability  negative external and internal Rotation lag sign  Neurovascularly intact.    His left shoulder deltopectoral incision has healed  The suture was removed  Active forward elevation 0-80  Passively goes 0-100  ER to 20  Biceps has good cosmetic appearance  He is neurovascular 
(4) no impairment

## 2024-04-05 ENCOUNTER — TELEPHONE (OUTPATIENT)
Dept: ORTHOPEDIC SURGERY | Age: 69
End: 2024-04-05

## 2024-04-05 DIAGNOSIS — Z96.612 AFTERCARE FOLLOWING LEFT SHOULDER JOINT REPLACEMENT SURGERY: Primary | ICD-10-CM

## 2024-04-05 DIAGNOSIS — Z47.1 AFTERCARE FOLLOWING LEFT SHOULDER JOINT REPLACEMENT SURGERY: Primary | ICD-10-CM

## 2024-04-05 NOTE — TELEPHONE ENCOUNTER
Mercedes with  [facility] needs the  PT order  faxed to  [phone]  this pt  has  an appt  set  up    Call her  at [phone] if you have any  questions.    I  was going to do this  but I  only saw a Home Health  closed order

## 2024-04-17 ENCOUNTER — OFFICE VISIT (OUTPATIENT)
Dept: ORTHOPEDIC SURGERY | Age: 69
End: 2024-04-17

## 2024-04-17 DIAGNOSIS — Z47.1 AFTERCARE FOLLOWING LEFT SHOULDER JOINT REPLACEMENT SURGERY: ICD-10-CM

## 2024-04-17 DIAGNOSIS — Z96.612 PRESENCE OF LEFT ARTIFICIAL SHOULDER JOINT: Primary | ICD-10-CM

## 2024-04-17 DIAGNOSIS — Z96.612 AFTERCARE FOLLOWING LEFT SHOULDER JOINT REPLACEMENT SURGERY: ICD-10-CM

## 2024-04-17 PROCEDURE — 99024 POSTOP FOLLOW-UP VISIT: CPT | Performed by: ORTHOPAEDIC SURGERY

## 2024-04-17 RX ORDER — NALOXONE HYDROCHLORIDE 4 MG/.1ML
1 SPRAY NASAL PRN
Qty: 1 EACH | Refills: 0 | Status: SHIPPED | OUTPATIENT
Start: 2024-04-17

## 2024-04-17 RX ORDER — OXYCODONE HYDROCHLORIDE 10 MG/1
10 TABLET ORAL EVERY 6 HOURS PRN
Qty: 40 TABLET | Refills: 0 | Status: SHIPPED | OUTPATIENT
Start: 2024-04-17 | End: 2024-04-27

## 2024-04-17 NOTE — PROGRESS NOTES
oxycodone 10 mg prescription.  We will now advance him in physical therapy to full motion and full strength for 6 weeks.  I will recheck him back in 6 weeks with new AP, Y and axillary views left shoulder    Follow up: Return in about 6 weeks (around 5/29/2024).             SAMUEL ALEXIS JR, MD

## 2024-04-24 DIAGNOSIS — Z96.612 PRESENCE OF LEFT ARTIFICIAL SHOULDER JOINT: ICD-10-CM

## 2024-04-25 ENCOUNTER — TELEPHONE (OUTPATIENT)
Dept: ORTHOPEDIC SURGERY | Age: 69
End: 2024-04-25

## 2024-04-25 DIAGNOSIS — Z96.612 PRESENCE OF LEFT ARTIFICIAL SHOULDER JOINT: Primary | ICD-10-CM

## 2024-04-25 RX ORDER — OXYCODONE HYDROCHLORIDE 10 MG/1
10 TABLET ORAL EVERY 6 HOURS PRN
Qty: 40 TABLET | Refills: 0 | Status: SHIPPED | OUTPATIENT
Start: 2024-04-27 | End: 2024-05-07

## 2024-04-25 RX ORDER — NALOXONE HYDROCHLORIDE 4 MG/.1ML
1 SPRAY NASAL PRN
Qty: 1 EACH | Refills: 0 | Status: SHIPPED | OUTPATIENT
Start: 2024-04-25

## 2024-04-25 NOTE — TELEPHONE ENCOUNTER
He needs to get a refill of his oxycodone sent to the pharmacy on file. He requested it on mychart but hasn't heard anything back.

## 2024-05-09 NOTE — PROGRESS NOTES
ACUTE PHYSICAL THERAPY GOALS:   (Developed with and agreed upon by patient and/or caregiver.)  GOALS (1-4 days):  (1.)  Patient will move from supine to sit and sit to supine  in bed with SUPERVISION.    (2.)  Patient will transfer from bed to chair and chair to bed with SUPERVISION using the least restrictive device.    (3.)  Patient will ambulate with SUPERVISION for 200 feet with the least restrictive device.   (4.)  Patient will be independent with shoulder HEP to increase range of motion per MD orders.  ________________________________________________________________________________________________      PHYSICAL THERAPY: SHOULDER Daily Note and AM  (Link to Caseload Tracking: PT Visit Days : 2  Acknowledge Orders Time In/Out  PT Charge Capture  Rehab Caseload Tracker    Damaso Kaur is a 68 y.o. male   PRIMARY DIAGNOSIS: Osteoarthritis of left glenohumeral joint  Osteoarthritis of left glenohumeral joint [M19.012]  Bicipital tendinitis of left shoulder [M75.22]  Osteoarthritis of glenohumeral joint, left [M19.012]  Procedure(s) (LRB):  REVERSE LEFT TOTAL SHOULDER ARTHROPLASTY WITH DELTA EXTEND PROSTHESIS, BICEPS TENODESIS (Left)  2 Days Post-Op  Reason for Referral: Pain in Left Shoulder (M25.512)  Stiffness of Left Shoulder, Not elsewhere classified (M25.612)  Other abnormalities of gait and mobility (R26.89)  Observation: Payor: MEDICARE / Plan: MEDICARE PART A AND B / Product Type: *No Product type* /     MOVEMENT PRECAUTIONS   TWICE A DAY  Active and passive range of motion left elbow hand  Active assisted and passive range of motion left Shoulder to tolerance  Pulleys and pendulums  Do not push motion  nwb left shoulder  wbat bilateral lower extremity  Sling left shoulder        REHAB RECOMMENDATIONS:   Recommendation to date pending progress:  Setting:  Home Health Therapy    Equipment:     Has sling     ASSESSMENT:   ASSESSMENT:  Mr. Kaur presents with decreased functional mobility and  Shoulder to tolerance  Pulleys and pendulums  Do not push motion  nwb left shoulder  wbat bilateral lower extremity  Sling left shoulder        Left Upper Extremity Weight Bearing: Non Weight Bearing     Restrictions/Precautions: Weight Bearing, ROM Restrictions, Surgical Protocols        HAND DOMINANCE:  Left []  Right [x]      UPPER EXTREMITY GROSS EVALUATION:   RIGHT UE   Within Functional Limits   Abnormal   Comments   Strength [x] []     Active Range of Motion [x] []     Passive Range of Motion           [] []        LEFT UE Within Functional Limits   Abnormal   Comments   Strength [] []  N/a   Active Range of Motion [] [] AROM LUE (degrees)  LUE AROM : Exceptions  L Shoulder Flexion (0-180): 20 (aarom)  L Elbow Flexion (0-145): 100 (aarom)  L Elbow Extension (145-0): 20  L Wrist Flexion (0-80): wfl  L Wrist Extension (0-70): wfl   Passive Range of Motion [] []       LOWER EXTREMITY GROSS EVALUATION:     Within Functional Limits   Abnormal   Comments   Strength [x] []    Range of Motion [x] []        COGNITION/  PERCEPTION: Intact Impaired (Comments):   Orientation [x]     Vision [x]  glasses   Hearing [x]     Cognition  [x]       MOBILITY: I Mod I S SBA CGA Min Mod Max Total  NT x2 Comments:   Bed Mobility    Rolling [] [] [] [] [] [] [] [] [] [x] []    Supine to Sit [] [] [] [] [] [] [] [] [] [x] []    Scooting [x] [] [] [] [] [] [] [] [] [] []    Sit to Supine [] [] [x] [] [] [] [] [] [] [] []    Transfers    Sit to Stand [] [] [x] [] [] [] [] [] [] [] []    Bed to Chair [] [] [] [] [] [] [] [] [] [] []    Stand to Sit [] [] [x] [] [] [] [] [] [] [] []    Stand Pivot [] [] [] [] [] [] [] [] [] [] []    Toilet [] [] [] [] [] [] [] [] [] [] []     [] [] [] [] [] [] [] [] [] [] []    I=Independent, Mod I=Modified Independent, S=Supervision, SBA=Standby Assistance, CGA=Contact Guard Assistance,   Min=Minimal Assistance, Mod=Moderate Assistance, Max=Maximal Assistance, Total=Total Assistance, NT=Not  [Normal] : gums are normal [Midline] : trachea located in midline position [de-identified] : right tonsil intact 1+, left tonsil removed [de-identified] : left tonsillar pillar and fossa with white granulation tissue, healing well

## 2024-05-17 ENCOUNTER — TELEPHONE (OUTPATIENT)
Dept: ORTHOPEDIC SURGERY | Age: 69
End: 2024-05-17

## 2024-05-17 DIAGNOSIS — Z96.612 PRESENCE OF LEFT ARTIFICIAL SHOULDER JOINT: Primary | ICD-10-CM

## 2024-05-17 RX ORDER — HYDROCODONE BITARTRATE AND ACETAMINOPHEN 10; 325 MG/1; MG/1
1 TABLET ORAL EVERY 6 HOURS PRN
Qty: 28 TABLET | Refills: 0 | Status: SHIPPED | OUTPATIENT
Start: 2024-05-17 | End: 2024-05-24

## 2024-05-17 NOTE — TELEPHONE ENCOUNTER
Called and spoke to pt who would like Norco because he thinks the oxy is too strong. Pt was taking Norco 10 prior to surgery from pain management and has not gotten any medications from them for over 2 months now. I advised pt that [facility] only prescribes medications during global period and patient was fine with that, stating that he does not think he will go back to pain management. Rx routed.

## 2024-05-17 NOTE — TELEPHONE ENCOUNTER
He needs to get a refill of pain medicine. He doesn't need oxycodone anymore but is wondering if he can get Norco sent in.

## 2024-05-22 RX ORDER — OXYCODONE HYDROCHLORIDE 10 MG/1
10 TABLET ORAL EVERY 6 HOURS PRN
Qty: 40 TABLET | Refills: 0 | OUTPATIENT
Start: 2024-05-22 | End: 2024-06-01

## 2024-05-29 ENCOUNTER — OFFICE VISIT (OUTPATIENT)
Dept: ORTHOPEDIC SURGERY | Age: 69
End: 2024-05-29

## 2024-05-29 DIAGNOSIS — Z47.1 AFTERCARE FOLLOWING LEFT SHOULDER JOINT REPLACEMENT SURGERY: ICD-10-CM

## 2024-05-29 DIAGNOSIS — Z96.612 PRESENCE OF LEFT ARTIFICIAL SHOULDER JOINT: Primary | ICD-10-CM

## 2024-05-29 DIAGNOSIS — M47.812 CERVICAL SPONDYLOSIS: ICD-10-CM

## 2024-05-29 DIAGNOSIS — Z96.612 AFTERCARE FOLLOWING LEFT SHOULDER JOINT REPLACEMENT SURGERY: ICD-10-CM

## 2024-05-29 PROCEDURE — 99024 POSTOP FOLLOW-UP VISIT: CPT | Performed by: ORTHOPAEDIC SURGERY

## 2024-05-29 RX ORDER — HYDROCODONE BITARTRATE AND ACETAMINOPHEN 10; 325 MG/1; MG/1
1 TABLET ORAL EVERY 6 HOURS PRN
Qty: 28 TABLET | Refills: 0 | Status: SHIPPED | OUTPATIENT
Start: 2024-05-29 | End: 2024-06-05

## 2024-05-29 RX ORDER — NALOXONE HYDROCHLORIDE 4 MG/.1ML
1 SPRAY NASAL PRN
Qty: 1 EACH | Refills: 0 | Status: SHIPPED | OUTPATIENT
Start: 2024-05-29

## 2024-05-29 NOTE — PROGRESS NOTES
Name: Damaso Kaur  YOB: 1955  Gender: male  MRN: [id number]              HPI: Damaso Kaur is a 68 y.o. right-hand-dominant male 2.5 months status post reverse left total shoulder arthroplasty with a delta xtend prosthesis biceps tenodesis.  He returns and notes that he is doing better.  He is on Xarelto for atrial fibrillation.  The shoulder still hurts.  His neck is his biggest problem.  It is very sore.  He has limited motion.  He complains of pain down the arm.  He has known cervical spondylosis      ROS/Meds/PSH/PMH/FH/SH: A ten system review of systems was performed and is negative other than what is in the HPI.   Tobacco:  reports that he has never smoked. He has never used smokeless tobacco.  There were no vitals taken for this visit.     Physical Examination:  He is an awake alert pleasant gentleman ambulating without difficulty  He has a restricted range of cervical spine motion without radicular findings    His right shoulder has well-healed incisions  The right shoulder has 0 to 180 degrees of active and 0 to 180 degrees passive forward elevation.   Mild overhead pain  Internal rotation is to T6.  External rotation is to 60 degrees at the side.   In the 90 degree abducted position 90 degrees of external and 90 degrees internal rotation  The AC joint is non-tender  SC joint is non-tender.   Greater tuberosity is non-tender.  negative biceps  Negative O'Briens sign  negative lift-off sign  Negative belly press sign  Negative bear huggers sign  negative drop sign  negative hornblower's sign  No posterior glenohumeral joint line tenderness.   No evident excessive external rotation  Rotator cuff strength is 5/5.  negative external rotation stress test.   Negative empty can sign  There is no evident anterior or posterior apprehension with a negative sulcus sign.   No instability  negative external and internal Rotation lag sign  Neurovascularly intact.    His left shoulder

## 2024-06-06 ENCOUNTER — TELEPHONE (OUTPATIENT)
Dept: ORTHOPEDIC SURGERY | Age: 69
End: 2024-06-06

## 2024-06-06 DIAGNOSIS — Z47.1 AFTERCARE FOLLOWING LEFT SHOULDER JOINT REPLACEMENT SURGERY: Primary | ICD-10-CM

## 2024-06-06 DIAGNOSIS — Z96.612 AFTERCARE FOLLOWING LEFT SHOULDER JOINT REPLACEMENT SURGERY: Primary | ICD-10-CM

## 2024-06-06 RX ORDER — HYDROCODONE BITARTRATE AND ACETAMINOPHEN 10; 325 MG/1; MG/1
1 TABLET ORAL EVERY 6 HOURS PRN
Qty: 28 TABLET | Refills: 0 | Status: SHIPPED | OUTPATIENT
Start: 2024-06-06 | End: 2024-06-13

## 2024-06-06 NOTE — TELEPHONE ENCOUNTER
He is really struggling with pain in therapy and is wondering if he can get a refill of his [place] sent to the pharmacy on file.

## 2024-06-06 NOTE — TELEPHONE ENCOUNTER
Called and spoke to pt to let him know that Norco has been routed to Aram Ruffin PA-C who will sign the rx since [facility] is out of town for the weekend. Informed pt that this is the last time Norco will be prescribed per AG and will need to reestablish care with pain management for any further pain prescriptions. Pt voiced understanding.

## 2024-06-19 ENCOUNTER — OFFICE VISIT (OUTPATIENT)
Age: 69
End: 2024-06-19
Payer: MEDICARE

## 2024-06-19 VITALS — HEIGHT: 67 IN | WEIGHT: 142.5 LBS | BODY MASS INDEX: 22.37 KG/M2

## 2024-06-19 DIAGNOSIS — M48.02 CERVICAL STENOSIS OF SPINAL CANAL: Primary | ICD-10-CM

## 2024-06-19 DIAGNOSIS — M47.812 ARTHROPATHY OF CERVICAL FACET JOINT: ICD-10-CM

## 2024-06-19 PROCEDURE — 3017F COLORECTAL CA SCREEN DOC REV: CPT | Performed by: NURSE PRACTITIONER

## 2024-06-19 PROCEDURE — G8428 CUR MEDS NOT DOCUMENT: HCPCS | Performed by: NURSE PRACTITIONER

## 2024-06-19 PROCEDURE — 99204 OFFICE O/P NEW MOD 45 MIN: CPT | Performed by: NURSE PRACTITIONER

## 2024-06-19 PROCEDURE — G8420 CALC BMI NORM PARAMETERS: HCPCS | Performed by: NURSE PRACTITIONER

## 2024-06-19 PROCEDURE — 1036F TOBACCO NON-USER: CPT | Performed by: NURSE PRACTITIONER

## 2024-06-19 PROCEDURE — 1123F ACP DISCUSS/DSCN MKR DOCD: CPT | Performed by: NURSE PRACTITIONER

## 2024-06-19 RX ORDER — TIZANIDINE 4 MG/1
4 TABLET ORAL 3 TIMES DAILY PRN
Qty: 30 TABLET | Refills: 0 | Status: SHIPPED | OUTPATIENT
Start: 2024-06-19

## 2024-06-19 RX ORDER — HYDROCODONE BITARTRATE AND ACETAMINOPHEN 7.5; 325 MG/1; MG/1
1 TABLET ORAL EVERY 8 HOURS PRN
Qty: 15 TABLET | Refills: 0 | Status: SHIPPED | OUTPATIENT
Start: 2024-06-19 | End: 2024-06-24

## 2024-06-19 NOTE — PROGRESS NOTES
Name: Damaso Kaur  YOB: 1955  Gender: male  MRN: [id number]    Chief complaint: Neck pain, intermittent left arm pain    History of present illness:    This is a very pleasant 68 y.o. old male who presents with a history of multiple surgeries and unfortunately chronic pain.  He was referred to me by Dr. Mark for neck pain.  Patient recently had a left reverse total shoulder.  He has had history of multiple surgeries on his elbows his right shoulder wrists.  Patient was followed by [facility] prior to Dr. Mark doing his last surgery.  He was on hydrocodone 10 chronically, but in the postoperative setting Dr. Mark had continued his medication management.  Patient was now referred to me with complaints of neck pain with inability to sleep at night.  He is currently in physical therapy.  He can have some paresthesias and pain down the arm.  Lastly going into the last 2 fingers on his left hand primarily the small finger.  Greatest complaint however is neck pain.  Patient is requesting something for pain until he can get back to his pain management provider.  He is unable to sleep due to his elevated levels of pain    There have not been associated changes in fine motor skills such as handwriting and buttoning buttons. There have not been changes in gait since the onset of the symptoms. The patient has not had cervical surgery in the past.    Thus far, efforts to address the pain have included gabapentin or lyrica, opiod pain medicine, physician directed home exercise program, and physical therapy 2 months             No data to display                   ROS/Meds/PSH/PMH/FH/SH: I personally reviewed the patient's collected intake data.  Below are the pertinents:    Allergies   Allergen Reactions    Carvedilol Other (See Comments)     Skin peeling when Coreg dose was increased    Meloxicam Shortness Of Breath    Pregabalin Hallucinations and Shortness Of Breath     Feel depressed

## 2024-07-17 ENCOUNTER — OFFICE VISIT (OUTPATIENT)
Dept: ORTHOPEDIC SURGERY | Age: 69
End: 2024-07-17
Payer: MEDICARE

## 2024-07-17 DIAGNOSIS — Z09 FOLLOW-UP EXAMINATION AFTER ORTHOPEDIC SURGERY: ICD-10-CM

## 2024-07-17 DIAGNOSIS — Z96.612 PRESENCE OF LEFT ARTIFICIAL SHOULDER JOINT: Primary | ICD-10-CM

## 2024-07-17 PROCEDURE — G8427 DOCREV CUR MEDS BY ELIG CLIN: HCPCS | Performed by: ORTHOPAEDIC SURGERY

## 2024-07-17 PROCEDURE — 99212 OFFICE O/P EST SF 10 MIN: CPT | Performed by: ORTHOPAEDIC SURGERY

## 2024-07-17 PROCEDURE — 3017F COLORECTAL CA SCREEN DOC REV: CPT | Performed by: ORTHOPAEDIC SURGERY

## 2024-07-17 PROCEDURE — 1036F TOBACCO NON-USER: CPT | Performed by: ORTHOPAEDIC SURGERY

## 2024-07-17 PROCEDURE — G8420 CALC BMI NORM PARAMETERS: HCPCS | Performed by: ORTHOPAEDIC SURGERY

## 2024-07-17 PROCEDURE — 1123F ACP DISCUSS/DSCN MKR DOCD: CPT | Performed by: ORTHOPAEDIC SURGERY

## 2024-07-17 NOTE — PROGRESS NOTES
T12  Biceps has good cosmetic appearance  He is neurovascular intact    His left thumb is point tender at the left CMC joint  Positive grind test  He has changes consistent with osteoarthritis in the small joints of the hand  He is neurovascularly intact        Data Reviewed:      XR: AP Y axillary views left shoulder   Clinical Indication    ICD-10-CM    1. Presence of left artificial shoulder joint  Z96.612 XR SHOULDER LEFT (MIN 2 VIEWS)      2. Follow-up examination after orthopedic surgery  Z09 XR SHOULDER LEFT (MIN 2 VIEWS)         Report: AP Y axillary views left shoulder demonstrate a reverse left total shoulder arthroplasty in excellent position    Impression: Status post reverse left total shoulder arthroplasty   SAMUEL ALEXIS JR, MD         MRI left shoulder dated 1/31/2024 demonstrates a type I acromion.  Evidence of a distal clavicle resection.  Rotator cuff appears intact.  There is evidence of biceps tendinopathy.  There is glenohumeral osteoarthritis.           Minor Procedure:      Impression:   1. Presence of left artificial shoulder joint    2. Follow-up examination after orthopedic surgery       Status post reverse left total shoulder arthroplasty with a delta xtend prosthesis biceps tenodesis 4 months  Postoperative hematoma left shoulder  Status post EUA and manipulation left shoulder arthroscopy left shoulder ASD, ADCR, debridement SLAP tear, lysis of adhesions 10.5 years  Status post arthroscopy right shoulder ASD ADCR ASR 16 years  Cervical spondylosis at multiple levels  Status post previous right knee arthroscopy  This post previous left knee arthroscopy  Status post revision open fasciotomy debridement extensor mechanism repair lateral epicondyle of both elbows  Status post primary open fasciotomy debridement and extensor mechanism repair lateral epicondyle of both elbows  Hypertension  Atrial fibrillation on Xarelto  Chronic pain on Norco 10    Plan:   I discussed the plan with the

## 2024-09-17 ENCOUNTER — OFFICE VISIT (OUTPATIENT)
Dept: ORTHOPEDIC SURGERY | Age: 69
End: 2024-09-17
Payer: MEDICARE

## 2024-09-17 DIAGNOSIS — Z09 FOLLOW-UP EXAMINATION AFTER ORTHOPEDIC SURGERY: ICD-10-CM

## 2024-09-17 DIAGNOSIS — M17.11 PRIMARY OSTEOARTHRITIS OF RIGHT KNEE: ICD-10-CM

## 2024-09-17 DIAGNOSIS — Z96.612 PRESENCE OF LEFT ARTIFICIAL SHOULDER JOINT: Primary | ICD-10-CM

## 2024-09-17 PROCEDURE — 1036F TOBACCO NON-USER: CPT | Performed by: ORTHOPAEDIC SURGERY

## 2024-09-17 PROCEDURE — 3017F COLORECTAL CA SCREEN DOC REV: CPT | Performed by: ORTHOPAEDIC SURGERY

## 2024-09-17 PROCEDURE — 99212 OFFICE O/P EST SF 10 MIN: CPT | Performed by: ORTHOPAEDIC SURGERY

## 2024-09-17 PROCEDURE — 1123F ACP DISCUSS/DSCN MKR DOCD: CPT | Performed by: ORTHOPAEDIC SURGERY

## 2024-09-17 PROCEDURE — G8420 CALC BMI NORM PARAMETERS: HCPCS | Performed by: ORTHOPAEDIC SURGERY

## 2024-09-17 PROCEDURE — G8427 DOCREV CUR MEDS BY ELIG CLIN: HCPCS | Performed by: ORTHOPAEDIC SURGERY

## 2024-10-04 ENCOUNTER — OFFICE VISIT (OUTPATIENT)
Dept: ORTHOPEDIC SURGERY | Age: 69
End: 2024-10-04
Payer: MEDICARE

## 2024-10-04 DIAGNOSIS — M17.11 PRIMARY OSTEOARTHRITIS OF RIGHT KNEE: Primary | ICD-10-CM

## 2024-10-04 PROCEDURE — 1123F ACP DISCUSS/DSCN MKR DOCD: CPT | Performed by: ORTHOPAEDIC SURGERY

## 2024-10-04 PROCEDURE — G8420 CALC BMI NORM PARAMETERS: HCPCS | Performed by: ORTHOPAEDIC SURGERY

## 2024-10-04 PROCEDURE — 20611 DRAIN/INJ JOINT/BURSA W/US: CPT | Performed by: ORTHOPAEDIC SURGERY

## 2024-10-04 PROCEDURE — 99204 OFFICE O/P NEW MOD 45 MIN: CPT | Performed by: ORTHOPAEDIC SURGERY

## 2024-10-04 PROCEDURE — 3017F COLORECTAL CA SCREEN DOC REV: CPT | Performed by: ORTHOPAEDIC SURGERY

## 2024-10-04 PROCEDURE — G8428 CUR MEDS NOT DOCUMENT: HCPCS | Performed by: ORTHOPAEDIC SURGERY

## 2024-10-04 PROCEDURE — G8484 FLU IMMUNIZE NO ADMIN: HCPCS | Performed by: ORTHOPAEDIC SURGERY

## 2024-10-04 PROCEDURE — 1036F TOBACCO NON-USER: CPT | Performed by: ORTHOPAEDIC SURGERY

## 2024-10-04 RX ORDER — HYALURONATE SODIUM 10 MG/ML
20 SYRINGE (ML) INTRAARTICULAR ONCE
Status: COMPLETED | OUTPATIENT
Start: 2024-10-04 | End: 2024-10-04

## 2024-10-04 RX ADMIN — Medication 20 MG: at 09:06

## 2024-10-04 NOTE — PROGRESS NOTES
for Nausea, Disp: 20 tablet, Rfl: 0    dilTIAZem (CARDIZEM 12 HR) 120 MG extended release capsule, Take 1 capsule by mouth nightly, Disp: , Rfl:     FLUoxetine (PROZAC) 20 MG capsule, Take 1 capsule by mouth daily, Disp: , Rfl:     losartan (COZAAR) 25 MG tablet, Take 1 tablet by mouth at bedtime, Disp: , Rfl:     XARELTO 20 MG TABS tablet, Take 1 tablet by mouth Daily with supper, Disp: , Rfl:     spironolactone (ALDACTONE) 25 MG tablet, Take 0.5 tablets by mouth daily 1/2 tablet in the morning , Disp: , Rfl:     traZODone (DESYREL) 100 MG tablet, Take 1 tablet by mouth nightly, Disp: , Rfl:   Allergies   Allergen Reactions    Carvedilol Other (See Comments)     Skin peeling when Coreg dose was increased    Meloxicam Shortness Of Breath    Pregabalin Hallucinations and Shortness Of Breath     Feel depressed    Aspirin Other (See Comments)     GI Upset    Cimetidine      Other reaction(s): kidneys    Metoclopramide      Other reaction(s): insomina     Past Medical History:   Diagnosis Date    Anxiety and depression     managed with medication    Atrial fibrillation (HCC)     on Xarelto, followed by Cardiology    Essential hypertension     managed with medication    Heart failure with improved ejection fraction (HFimpEF) (HCC)     Followed by Cardiology    History of skin cancer     melanoma and squamous cell    Insomnia     Mixed hyperlipidemia      .pmh  Past Surgical History:   Procedure Laterality Date    KNEE ARTHROSCOPY Bilateral     ORTHOPEDIC SURGERY Bilateral     elbow    SHOULDER ARTHROSCOPY Bilateral     SHOULDER SURGERY Left 3/14/2024    REVERSE LEFT TOTAL SHOULDER ARTHROPLASTY WITH DELTA EXTEND PROSTHESIS, BICEPS TENODESIS [place] Jonah Mark Jr., [place] [facility] [place]     No family history on file.  Social History     Socioeconomic History    Marital status:      Spouse name: Not on file    Number of children: Not on file    Years of education: Not on file    Highest education level: Not on

## 2024-10-11 ENCOUNTER — OFFICE VISIT (OUTPATIENT)
Dept: ORTHOPEDIC SURGERY | Age: 69
End: 2024-10-11

## 2024-10-11 DIAGNOSIS — M17.11 PRIMARY OSTEOARTHRITIS OF RIGHT KNEE: Primary | ICD-10-CM

## 2024-10-11 RX ORDER — HYALURONATE SODIUM 10 MG/ML
20 SYRINGE (ML) INTRAARTICULAR ONCE
Status: COMPLETED | OUTPATIENT
Start: 2024-10-11 | End: 2024-10-11

## 2024-10-11 RX ADMIN — Medication 20 MG: at 09:27

## 2024-10-11 NOTE — PROGRESS NOTES
Name: Damaso Kaur  YOB: 1955  Gender: male  MRN: [id number]      CC: Right Knee Pain     PROCEDURE: 2 of 3 HP    DIAGNOSIS:    Encounter Diagnosis   Name Primary?    Primary osteoarthritis of right knee Yes       CubeTree US unit with variable frequency (6.0-15.0 MHz) linear transducer was used visualize the intracondylar notch, retropatellar fat pad, patella tendon, patella, tibia, and to ensure proper intra-articular needle placement.  Injection image was saved to patient's permanent chart.    Procedure Note: Time out was performed which included identifying the patient by name and date of birth.  The procedure site was identified with all present in agreement.   The patient was placed in upright position with knee hanging freely from exam table.  The right  knee was prepped in sterile fashion using alcohol wipe.  Using Mindray ultrasound guidance, a 22 gauge needle was then introduced into the knee joint from an infrapatellar approach and  2 mL of Euflexxa was injected freely.  The needle was then removed, pressure hemostasis achieved, injection site was cleansed with alcohol wipe and dressed with band aid.    The patient tolerated the procedure without complication.  The patient  will follow up as scheduled.

## 2024-10-18 ENCOUNTER — OFFICE VISIT (OUTPATIENT)
Dept: ORTHOPEDIC SURGERY | Age: 69
End: 2024-10-18
Payer: MEDICARE

## 2024-10-18 DIAGNOSIS — M17.11 PRIMARY OSTEOARTHRITIS OF RIGHT KNEE: Primary | ICD-10-CM

## 2024-10-18 PROCEDURE — 20611 DRAIN/INJ JOINT/BURSA W/US: CPT | Performed by: PHYSICIAN ASSISTANT

## 2024-10-18 RX ORDER — HYALURONATE SODIUM 10 MG/ML
20 SYRINGE (ML) INTRAARTICULAR ONCE
Status: COMPLETED | OUTPATIENT
Start: 2024-10-18 | End: 2024-10-18

## 2024-10-18 RX ADMIN — Medication 20 MG: at 09:22

## 2024-10-18 NOTE — PROGRESS NOTES
Name: Damaso Kaur  YOB: 1955  Gender: male  MRN: [id number]      CC: Right Knee Pain     PROCEDURE: 3 of 3 HP    DIAGNOSIS:    Encounter Diagnosis   Name Primary?    Primary osteoarthritis of right knee Yes       Teravac US unit with variable frequency (6.0-15.0 MHz) linear transducer was used visualize the intracondylar notch, retropatellar fat pad, patella tendon, patella, tibia, and to ensure proper intra-articular needle placement.  Injection image was saved to patient's permanent chart.    Procedure Note: Time out was performed which included identifying the patient by name and date of birth.  The procedure site was identified with all present in agreement.   The patient was placed in upright position with knee hanging freely from exam table.  The right  knee was prepped in sterile fashion using alcohol wipe.  Using Mindray ultrasound guidance, a 22 gauge needle was then introduced into the knee joint from an infrapatellar approach and  2 mL of Euflexxa was injected freely.  The needle was then removed, pressure hemostasis achieved, injection site was cleansed with alcohol wipe and dressed with band aid.    The patient tolerated the procedure without complication.  He reports significant improvement in his right knee pain and less crepitation thus far.  He plans to return in 6 months to  repeat HP.

## 2025-01-29 DIAGNOSIS — M54.2 NECK PAIN: Primary | ICD-10-CM

## 2025-01-29 DIAGNOSIS — M48.02 CERVICAL STENOSIS OF SPINAL CANAL: Primary | ICD-10-CM

## 2025-01-29 RX ORDER — METHYLPREDNISOLONE 4 MG/1
TABLET ORAL
Qty: 1 KIT | Refills: 0 | Status: SHIPPED | OUTPATIENT
Start: 2025-01-29

## 2025-03-05 ENCOUNTER — TELEPHONE (OUTPATIENT)
Dept: ORTHOPEDIC SURGERY | Age: 70
End: 2025-03-05

## 2025-03-05 NOTE — TELEPHONE ENCOUNTER
Cervical radiculopathy    Can I schedule this pt.? MRI done in 2024 under Dr. Mark    Let me know      Thanks.

## 2025-03-11 ENCOUNTER — TELEPHONE (OUTPATIENT)
Dept: ORTHOPEDIC SURGERY | Age: 70
End: 2025-03-11

## 2025-03-11 ENCOUNTER — OFFICE VISIT (OUTPATIENT)
Age: 70
End: 2025-03-11
Payer: MEDICARE

## 2025-03-11 DIAGNOSIS — M54.12 CERVICAL RADICULOPATHY: Primary | ICD-10-CM

## 2025-03-11 PROCEDURE — G8420 CALC BMI NORM PARAMETERS: HCPCS | Performed by: ORTHOPAEDIC SURGERY

## 2025-03-11 PROCEDURE — 99204 OFFICE O/P NEW MOD 45 MIN: CPT | Performed by: ORTHOPAEDIC SURGERY

## 2025-03-11 PROCEDURE — 1159F MED LIST DOCD IN RCRD: CPT | Performed by: ORTHOPAEDIC SURGERY

## 2025-03-11 PROCEDURE — 1123F ACP DISCUSS/DSCN MKR DOCD: CPT | Performed by: ORTHOPAEDIC SURGERY

## 2025-03-11 PROCEDURE — G8427 DOCREV CUR MEDS BY ELIG CLIN: HCPCS | Performed by: ORTHOPAEDIC SURGERY

## 2025-03-11 PROCEDURE — 1036F TOBACCO NON-USER: CPT | Performed by: ORTHOPAEDIC SURGERY

## 2025-03-11 PROCEDURE — 3017F COLORECTAL CA SCREEN DOC REV: CPT | Performed by: ORTHOPAEDIC SURGERY

## 2025-03-11 NOTE — PROGRESS NOTES
Name: Damaso Kaur  YOB: 1955  Gender: male  MRN: [id number]  Age: 69 y.o.    Chief Complaint: Neck pain bilateral arm pain  History of present illness:    This is a very pleasant 69 y.o. male who presents with 1 year history of neck pain and bilateral arm pain.  The right arm is affected more the left arm.  He states that he gets numbness that radiates down his right arm to his thumb.  He also has bilateral shoulder pain.  Denies any issues with his balance.  Pain is 8/10 in severity.  He had an injection done by Dr. Araujo which helped for 2 to 3 days.  He does have a history of A-fib and takes Xarelto.  He started physical therapy on Friday.      Medications:     Prior to Visit Medications    Medication Sig Taking? Authorizing Provider   methylPREDNISolone (MEDROL DOSEPACK) 4 MG tablet Take as directed  Posta, Jonah HELLER Jr., MD   tiZANidine (ZANAFLEX) 4 MG tablet Take 1 tablet by mouth 3 times daily as needed (muscle spasm)  Ismael Parkinson, ANGELES - CNP   naloxone 4 MG/0.1ML LIQD nasal spray 1 spray by Nasal route as needed for Opioid Reversal  Posta, Jonah HELLER Jr., MD   naloxone 4 MG/0.1ML LIQD nasal spray 1 spray by Nasal route as needed for Opioid Reversal  Posta, Jonah HELLER Jr., MD   naloxone 4 MG/0.1ML LIQD nasal spray 1 spray by Nasal route as needed for Opioid Reversal  Posta, Jonah HELLER Jr., MD   naloxone 4 MG/0.1ML LIQD nasal spray 1 spray by Nasal route as needed for Opioid Reversal  Posta, Jonah HELLER Jr., MD   naloxone 4 MG/0.1ML LIQD nasal spray 1 spray by Nasal route as needed for Opioid Reversal  Posta, Jonah HELLER Jr., MD   acetaminophen (TYLENOL) 500 MG tablet Take 500 mg by mouth every 6 hours as needed for Pain (breakthrough pain).  Provider, MD Ray   naloxone 4 MG/0.1ML LIQD nasal spray 1 spray by Nasal route as needed for Opioid Reversal  Posta, Jonah HELLER Jr., MD   promethazine (PHENERGAN) 25 MG tablet Take 1 tablet by mouth every 6 hours as needed for Nausea  Posta, Jonah HELLER Jr., MD

## 2025-03-12 ENCOUNTER — PROCEDURE VISIT (OUTPATIENT)
Age: 70
End: 2025-03-12

## 2025-03-12 DIAGNOSIS — M54.12 C6 RADICULOPATHY: ICD-10-CM

## 2025-03-12 DIAGNOSIS — G62.9 SENSORY NEUROPATHY: Primary | ICD-10-CM

## 2025-03-18 ENCOUNTER — OFFICE VISIT (OUTPATIENT)
Age: 70
End: 2025-03-18
Payer: MEDICARE

## 2025-03-18 DIAGNOSIS — M54.12 CERVICAL RADICULOPATHY: Primary | ICD-10-CM

## 2025-03-18 PROCEDURE — 1159F MED LIST DOCD IN RCRD: CPT | Performed by: ORTHOPAEDIC SURGERY

## 2025-03-18 PROCEDURE — G8420 CALC BMI NORM PARAMETERS: HCPCS | Performed by: ORTHOPAEDIC SURGERY

## 2025-03-18 PROCEDURE — 1123F ACP DISCUSS/DSCN MKR DOCD: CPT | Performed by: ORTHOPAEDIC SURGERY

## 2025-03-18 PROCEDURE — 99213 OFFICE O/P EST LOW 20 MIN: CPT | Performed by: ORTHOPAEDIC SURGERY

## 2025-03-18 PROCEDURE — 3017F COLORECTAL CA SCREEN DOC REV: CPT | Performed by: ORTHOPAEDIC SURGERY

## 2025-03-18 PROCEDURE — 1036F TOBACCO NON-USER: CPT | Performed by: ORTHOPAEDIC SURGERY

## 2025-03-18 PROCEDURE — G8427 DOCREV CUR MEDS BY ELIG CLIN: HCPCS | Performed by: ORTHOPAEDIC SURGERY

## 2025-03-18 NOTE — PROGRESS NOTES
Name: Damaso Kaur  YOB: 1955  Gender: male  MRN: [id number]  Age: 69 y.o.    Chief Complaint: EMG follow-up  History of present illness:    This is a very pleasant 69 y.o. male who presents with history of neck pain and bilateral shoulder pain.  He also gets tingling in his right thumb.  Pain is 3/10 in severity.  He is here for EMG follow-up.        Medications:     Prior to Visit Medications    Medication Sig Taking? Authorizing Provider   methylPREDNISolone (MEDROL DOSEPACK) 4 MG tablet Take as directed  Posta, Jonah HELLER Jr., MD   tiZANidine (ZANAFLEX) 4 MG tablet Take 1 tablet by mouth 3 times daily as needed (muscle spasm)  Ismael Parkinson APRN - CNP   naloxone 4 MG/0.1ML LIQD nasal spray 1 spray by Nasal route as needed for Opioid Reversal  Posta, Jonah HELLER Jr., MD   naloxone 4 MG/0.1ML LIQD nasal spray 1 spray by Nasal route as needed for Opioid Reversal  Posta, Jonah HELLER Jr., MD   naloxone 4 MG/0.1ML LIQD nasal spray 1 spray by Nasal route as needed for Opioid Reversal  Posta, Jonah HELLER Jr., MD   naloxone 4 MG/0.1ML LIQD nasal spray 1 spray by Nasal route as needed for Opioid Reversal  Posta, Jonah HELLER Jr., MD   naloxone 4 MG/0.1ML LIQD nasal spray 1 spray by Nasal route as needed for Opioid Reversal  Posta, Jonah HELLER Jr., MD   acetaminophen (TYLENOL) 500 MG tablet Take 500 mg by mouth every 6 hours as needed for Pain (breakthrough pain).  ProviderRay MD   naloxone 4 MG/0.1ML LIQD nasal spray 1 spray by Nasal route as needed for Opioid Reversal  PostaJonah Jr., MD   promethazine (PHENERGAN) 25 MG tablet Take 1 tablet by mouth every 6 hours as needed for Nausea  Posta, Jonah HELLER Jr., MD   dilTIAZem (CARDIZEM 12 HR) 120 MG extended release capsule Take 1 capsule by mouth nightly  Ray Roy MD   FLUoxetine (PROZAC) 20 MG capsule Take 1 capsule by mouth daily  Ray Roy MD   losartan (COZAAR) 25 MG tablet Take 1 tablet by mouth at bedtime  Ray Roy MD

## 2025-03-19 ENCOUNTER — OFFICE VISIT (OUTPATIENT)
Dept: ORTHOPEDIC SURGERY | Age: 70
End: 2025-03-19

## 2025-03-19 DIAGNOSIS — Z09 FOLLOW-UP EXAMINATION AFTER ORTHOPEDIC SURGERY: ICD-10-CM

## 2025-03-19 DIAGNOSIS — Z96.612 PRESENCE OF LEFT ARTIFICIAL SHOULDER JOINT: Primary | ICD-10-CM

## 2025-03-19 NOTE — PROGRESS NOTES
[facility]          Patient ID:  Name: Damaso Kaur  AGE/Gender: 69 y.o. male  MRN: [id number]  : 1955    Date of Consultation:  2025          ALLERGIES:   Allergies   Allergen Reactions    Carvedilol Other (See Comments)     Skin peeling when Coreg dose was increased    Meloxicam Shortness Of Breath    Pregabalin Hallucinations and Shortness Of Breath     Feel depressed    Aspirin Other (See Comments)     GI Upset    Cimetidine      Other reaction(s): kidneys    Metoclopramide      Other reaction(s): insomina            History:  The patient 69 y.o. Righthand dominant male who presents today for follow up. They underwent Left Reverse TSA by Dr. Mark on 3/14/24. They are doing well with the shoulder having no complaints or concerns.     Review of Systems:  Pertinent items are noted in HPI.    Past Medical History Includes:   Past Medical History:   Diagnosis Date    Anxiety and depression     managed with medication    Atrial fibrillation (HCC)     on Xarelto, followed by Cardiology    Essential hypertension     managed with medication    Heart failure with improved ejection fraction (HFimpEF) (HCC)     Followed by Cardiology    History of skin cancer     melanoma and squamous cell    Insomnia     Mixed hyperlipidemia    ,   Past Surgical History:   Procedure Laterality Date    KNEE ARTHROSCOPY Bilateral     ORTHOPEDIC SURGERY Bilateral     elbow    SHOULDER ARTHROSCOPY Bilateral     SHOULDER SURGERY Left 3/14/2024    REVERSE LEFT TOTAL SHOULDER ARTHROPLASTY WITH DELTA EXTEND PROSTHESIS, BICEPS TENODESIS performed by Jonah Mark Jr., MD at [facility] [place] [facility]       Family History: No family history on file.     Social History:   Social History     Tobacco Use    Smoking status: Never    Smokeless tobacco: Never   Substance Use Topics    Alcohol use: Yes     Alcohol/week: 1.0 standard drink of alcohol     Types: 1 Cans of beer per week     Comment: social         Physical Exam:

## 2025-04-01 ENCOUNTER — TELEPHONE (OUTPATIENT)
Dept: ORTHOPEDIC SURGERY | Age: 70
End: 2025-04-01

## 2025-04-01 NOTE — TELEPHONE ENCOUNTER
Returned call to patient he is aware I will ask Dr. Otero if he would like to see patient in office and call patient back. Patient is aware surgery is postponed due to equipment sterilization issue. Patient verbalized understanding.

## 2025-04-04 ENCOUNTER — TELEPHONE (OUTPATIENT)
Dept: ORTHOPEDIC SURGERY | Age: 70
End: 2025-04-04

## 2025-04-04 NOTE — TELEPHONE ENCOUNTER
Returned call to patient he is aware Dr. Otero wants to see him office to discuss surgery. Patient is scheduled for OV on 4/9/25 at 11:45 am.

## 2025-04-09 ENCOUNTER — OFFICE VISIT (OUTPATIENT)
Age: 70
End: 2025-04-09
Payer: MEDICARE

## 2025-04-09 DIAGNOSIS — M24.811 INTERNAL DERANGEMENT OF RIGHT SHOULDER: ICD-10-CM

## 2025-04-09 DIAGNOSIS — M54.12 CERVICAL RADICULOPATHY: Primary | ICD-10-CM

## 2025-04-09 PROCEDURE — 1159F MED LIST DOCD IN RCRD: CPT | Performed by: ORTHOPAEDIC SURGERY

## 2025-04-09 PROCEDURE — 3017F COLORECTAL CA SCREEN DOC REV: CPT | Performed by: ORTHOPAEDIC SURGERY

## 2025-04-09 PROCEDURE — 1036F TOBACCO NON-USER: CPT | Performed by: ORTHOPAEDIC SURGERY

## 2025-04-09 PROCEDURE — G8427 DOCREV CUR MEDS BY ELIG CLIN: HCPCS | Performed by: ORTHOPAEDIC SURGERY

## 2025-04-09 PROCEDURE — G8420 CALC BMI NORM PARAMETERS: HCPCS | Performed by: ORTHOPAEDIC SURGERY

## 2025-04-09 PROCEDURE — 99213 OFFICE O/P EST LOW 20 MIN: CPT | Performed by: ORTHOPAEDIC SURGERY

## 2025-04-09 PROCEDURE — 1123F ACP DISCUSS/DSCN MKR DOCD: CPT | Performed by: ORTHOPAEDIC SURGERY

## 2025-04-09 NOTE — PROGRESS NOTES
Radiographic Studies:    Radiographs:    AP and lateral views of the cervical spine, reveal disc degeneration at C5-6 and C6-7    Radiograph impression: Cervical spondylosis      MRI Cervical spine, report and images reviewed and interpreted and reveals there is disc degeneration present at C5-6 and C6-7.  There is moderate bilateral foraminal stenosis at C5-6 and C6-7.    Course and size of the vertebral arteries: Normal      Diagnosis:      ICD-10-CM    1. Cervical radiculopathy  M54.12       2. Internal derangement of right shoulder  M24.811 MRI SHOULDER RIGHT WO CONTRAST          Assessment/Plan:      -I discussed the patient the diagnosis as well is the imaging findings.  At this time I recommend a MRI of the right shoulder to evaluate for pathology.  Patient was in agreement with the plan  -Patient will follow-up after MRI of his right shoulder.  - I believe the patient to be a candidate for a C5-6 and C6-7 ACDF      -Follow-up after MRI of the right shoulder        Amos Otero MD  Orthopaedic Spine Surgery     04/09/25  12:13 PM    All or part of this note was transcribed using dictation software.  Although the note was proofread, grammatical and spelling errors may occur.

## 2025-04-16 ENCOUNTER — OFFICE VISIT (OUTPATIENT)
Age: 70
End: 2025-04-16
Payer: MEDICARE

## 2025-04-16 DIAGNOSIS — M24.811 INTERNAL DERANGEMENT OF RIGHT SHOULDER: Primary | ICD-10-CM

## 2025-04-16 PROCEDURE — 1036F TOBACCO NON-USER: CPT | Performed by: ORTHOPAEDIC SURGERY

## 2025-04-16 PROCEDURE — 3017F COLORECTAL CA SCREEN DOC REV: CPT | Performed by: ORTHOPAEDIC SURGERY

## 2025-04-16 PROCEDURE — G8420 CALC BMI NORM PARAMETERS: HCPCS | Performed by: ORTHOPAEDIC SURGERY

## 2025-04-16 PROCEDURE — 99213 OFFICE O/P EST LOW 20 MIN: CPT | Performed by: ORTHOPAEDIC SURGERY

## 2025-04-16 PROCEDURE — 1123F ACP DISCUSS/DSCN MKR DOCD: CPT | Performed by: ORTHOPAEDIC SURGERY

## 2025-04-16 PROCEDURE — G8427 DOCREV CUR MEDS BY ELIG CLIN: HCPCS | Performed by: ORTHOPAEDIC SURGERY

## 2025-04-16 PROCEDURE — 1159F MED LIST DOCD IN RCRD: CPT | Performed by: ORTHOPAEDIC SURGERY

## 2025-04-16 NOTE — PROGRESS NOTES
Name: Damaso Kaur  YOB: 1955  Gender: male  MRN: [id number]  Age: 69 y.o.    Chief Complaint: MRI of the shoulder follow-up  History of present illness:    This is a very pleasant 69 y.o. male who presents with a history of right arm pain.  He has had a EMG done which was suggestive of a right C6 radiculopathy and sensory peripheral neuropathy.  He continues to have dull right shoulder pain that is worse with activity.        Medications:     Prior to Visit Medications    Medication Sig Taking? Authorizing Provider   methylPREDNISolone (MEDROL DOSEPACK) 4 MG tablet Take as directed  Posta, Jonah HELLER Jr., MD   tiZANidine (ZANAFLEX) 4 MG tablet Take 1 tablet by mouth 3 times daily as needed (muscle spasm)  Ismael Parkinson APRN - CNP   naloxone 4 MG/0.1ML LIQD nasal spray 1 spray by Nasal route as needed for Opioid Reversal  Posta, Jonah HELLER Jr., MD   naloxone 4 MG/0.1ML LIQD nasal spray 1 spray by Nasal route as needed for Opioid Reversal  Posta, Jonah HELLER Jr., MD   naloxone 4 MG/0.1ML LIQD nasal spray 1 spray by Nasal route as needed for Opioid Reversal  Posta, Jonah HELLER Jr., MD   naloxone 4 MG/0.1ML LIQD nasal spray 1 spray by Nasal route as needed for Opioid Reversal  Posta, Jonah HELLER Jr., MD   naloxone 4 MG/0.1ML LIQD nasal spray 1 spray by Nasal route as needed for Opioid Reversal  Posta, Jonah HELLER Jr., MD   acetaminophen (TYLENOL) 500 MG tablet Take 500 mg by mouth every 6 hours as needed for Pain (breakthrough pain).  ProviderRay MD   naloxone 4 MG/0.1ML LIQD nasal spray 1 spray by Nasal route as needed for Opioid Reversal  Posta, Jonah HELLER Jr., MD   promethazine (PHENERGAN) 25 MG tablet Take 1 tablet by mouth every 6 hours as needed for Nausea  Posta, Jonah HELLER Jr., MD   dilTIAZem (CARDIZEM 12 HR) 120 MG extended release capsule Take 1 capsule by mouth nightly  ProviderRay MD   FLUoxetine (PROZAC) 20 MG capsule Take 1 capsule by mouth daily  ProviderRay MD   losartan (COZAAR)

## 2025-04-17 ENCOUNTER — OFFICE VISIT (OUTPATIENT)
Dept: ORTHOPEDIC SURGERY | Age: 70
End: 2025-04-17

## 2025-04-17 DIAGNOSIS — M12.811 ROTATOR CUFF ARTHROPATHY OF RIGHT SHOULDER: Primary | ICD-10-CM

## 2025-04-17 RX ORDER — METHYLPREDNISOLONE ACETATE 80 MG/ML
80 INJECTION, SUSPENSION INTRA-ARTICULAR; INTRALESIONAL; INTRAMUSCULAR; SOFT TISSUE ONCE
Status: COMPLETED | OUTPATIENT
Start: 2025-04-17 | End: 2025-04-17

## 2025-04-17 RX ADMIN — METHYLPREDNISOLONE ACETATE 80 MG: 80 INJECTION, SUSPENSION INTRA-ARTICULAR; INTRALESIONAL; INTRAMUSCULAR; SOFT TISSUE at 13:40

## 2025-04-17 NOTE — PROGRESS NOTES
[facility]          Patient ID:  Name: Damaso Kaur  AGE/Gender: 69 y.o. male  MRN: [id number]  : 1955    Date of Consultation:  2025    ALLERGIES:   Allergies   Allergen Reactions    Carvedilol Other (See Comments)     Skin peeling when Coreg dose was increased    Meloxicam Shortness Of Breath    Pregabalin Hallucinations and Shortness Of Breath     Feel depressed    Aspirin Other (See Comments)     GI Upset    Cimetidine      Other reaction(s): kidneys    Metoclopramide      Other reaction(s): insomina        Diagnosis: Rotator cuff arthropathy right shoulder      PROCEDURE:  Depo-Medrol Injection right shoulder        The procedure was explained to the patient and possible adverse reactions were discussed.      TIME OUT performed immediately prior to start of procedure:   IAram PA, have performed the following reviews on Damaso Kaur prior to the start of the procedure:            * Patient was identified by name and date of birth   * Agreement on procedure being performed was verified  * Risks and Benefits explained to the patient  * Procedure site verified and marked as necessary  * Patient was positioned for comfort    After the area was prepped and cleansed in sterile fashion with chlorhexidine and alcohol a solution of 4.5cc of 2% xylocaine, 4.5cc of 0.5% bupivacaine and 1cc of 80mg of depomedrol was injected into the  *Right Subacromial space   .     How tolerated by patient: tolerated the procedure well with no complications    Post injection instructions were given to Damaso Kaur:       Electronically signed by:   YULY Lemus  2025,  1:39 PM

## (undated) DEVICE — BANDAGE,GAUZE,BULKEE II,4.5"X4.1YD,STRL: Brand: MEDLINE

## (undated) DEVICE — SCREW BNE L14MM DIA4.5MM PROX CORT TIB S STL ST LOK FULL
Type: IMPLANTABLE DEVICE | Site: SHOULDER | Status: NON-FUNCTIONAL
Removed: 2024-03-14

## (undated) DEVICE — DISPOSABLE DRAPE, STERILE, FOR A CDS-3060 5 FOOT TABLE: Brand: PEDIGO PRODUCTS, INC.

## (undated) DEVICE — GUIDEPIN ORTH L190MM DIA2.5MM METAGLENE CTRL FOR DELT XTEND

## (undated) DEVICE — GUIDEPIN ORTH L300MM DIA1.5MM GLENOSPHERE FOR DELT XTEND

## (undated) DEVICE — SLING & SWATHE: Brand: DEROYAL

## (undated) DEVICE — 4.0MM ROUND FAST CUTTING BUR

## (undated) DEVICE — SOLUTION IRRIG 3000ML 0.9% SOD CHL USP UROMATIC PLAS CONT

## (undated) DEVICE — DRAPE TWL SURG 16X26IN BLU ORB04] ALLCARE INC]

## (undated) DEVICE — PACKING WND W1INXL6FT VAG WVN COT GZ RADPQ

## (undated) DEVICE — PENCIL ES L3M BTTN SWCH HOLSTER W/ BLDE ELECTRD EDGE

## (undated) DEVICE — GAUZE,SPONGE,4"X4",16PLY,STRL,LF,10/TRAY: Brand: MEDLINE

## (undated) DEVICE — GLOVE SURG SZ 6 L12IN FNGR THK79MIL GRN LTX FREE

## (undated) DEVICE — GLOVE SURG SZ 6 THK91MIL LTX FREE SYN POLYISOPRENE ANTI

## (undated) DEVICE — OSCILLATING TIP SAW CARTRIDGE: Brand: PRECISION FALCON

## (undated) DEVICE — ARGYLE SIGMOID SURGICAL SUCTION INSTRUMENT 23 FR/CH (7.7 MM): Brand: ARGYLE

## (undated) DEVICE — HANDPIECE SET WITH COAXIAL HIGH FLOW TIP AND SUCTION TUBE: Brand: INTERPULSE

## (undated) DEVICE — GLOVE ORANGE PI 8 1/2   MSG9085

## (undated) DEVICE — Device

## (undated) DEVICE — SUTURE N ABSRB BRAIDED 5-0 CTX 39 IN 48 MM WHT BLK XBRAID S 3910900052

## (undated) DEVICE — OSCILLATING TIP SAW CARTRIDGE: Brand: STRYKER PRECISION

## (undated) DEVICE — COVER,TABLE,HEAVY DUTY,79"X110",STRL: Brand: MEDLINE

## (undated) DEVICE — SUTURE N ABSRB BRAIDED 2-0 MO-6 39 IN 26 MM 1/2 CIR BLU BLK 3910900023

## (undated) DEVICE — GARMENT,MEDLINE,DVT,INT,CALF,MED, GEN2: Brand: MEDLINE

## (undated) DEVICE — PAD,ABDOMINAL,5"X9",ST,LF,25/BX: Brand: MEDLINE INDUSTRIES, INC.

## (undated) DEVICE — TOTAL SHOULDER DR POSTA: Brand: MEDLINE INDUSTRIES, INC.

## (undated) DEVICE — 3M™ STERI-DRAPE™ INSTRUMENT POUCH 1018: Brand: STERI-DRAPE™